# Patient Record
Sex: MALE | Race: WHITE | NOT HISPANIC OR LATINO | Employment: FULL TIME | URBAN - NONMETROPOLITAN AREA
[De-identification: names, ages, dates, MRNs, and addresses within clinical notes are randomized per-mention and may not be internally consistent; named-entity substitution may affect disease eponyms.]

---

## 2025-03-11 NOTE — PROGRESS NOTES
"Subjective   Patient ID: Oneil Montoya is a 35 y.o. male who presents for Establish Care.    HPI   Pt here to establish.    Has an issue involving his left big toe.  Noticed few weeks ago : there was hole in left great toe.  Since then it has progressed to large white halo with central hole.  No h/o diabetes.  FH of diabetes.  Denies any injury.  Has had a previous toenail infection yrs ago. No drainage.  Has no feeling in either feet.      He would also like bloodwork to screen for diabetes    History reviewed. No pertinent past medical history.  Current Outpatient Medications   Medication Sig Dispense Refill    amoxicillin-pot clavulanate (Augmentin) 875-125 mg tablet Take 1 tablet (875 mg) by mouth 2 times a day for 7 days. 14 tablet 0     No current facility-administered medications for this visit.       Review of Systems see hpi    Objective   /82   Pulse (!) 113   Ht 1.924 m (6' 3.75\")   Wt 142 kg (312 lb 9.6 oz)   SpO2 98%   BMI 38.30 kg/m²     Physical Exam  Vitals reviewed.   Constitutional:       Appearance: Normal appearance.   Cardiovascular:      Rate and Rhythm: Normal rate and regular rhythm.      Heart sounds: Normal heart sounds.   Pulmonary:      Effort: Pulmonary effort is normal.      Breath sounds: Normal breath sounds.   Skin:     Comments: On bottom of left great toe is a hole about 5 mm in diameter surrounded by white skin/tissue that has split into a fissure.  Some surrounding erythema. Have no idea how deep this hole goes.  Pt has no sensation over toes and 1/2 down foot; there is some feeling at heel.   Neurological:      Mental Status: He is alert.         Assessment/Plan   Assessment & Plan  Skin ulcer of left great toe, unspecified ulcer stage (Multi)  Will need debriding by podiatry.  Will check for osteomyelitis by xray  Orders:    XR toe left 2+ views; Future    Referral to Podiatry; Future    amoxicillin-pot clavulanate (Augmentin) 875-125 mg tablet; Take 1 tablet (875 " mg) by mouth 2 times a day for 7 days.    Elevated blood pressure reading in office without diagnosis of hypertension  Starting checking pressures at home; currently pt w/o ha, blurred vision, sob, cp.  Goal is < 135/85 at home.       Screening for diabetes mellitus    Orders:    Comprehensive Metabolic Panel; Future    Hemoglobin A1C; Future    Screening for lipid disorders    Orders:    Lipid Panel; Future

## 2025-03-13 ENCOUNTER — APPOINTMENT (OUTPATIENT)
Dept: PRIMARY CARE | Facility: CLINIC | Age: 35
End: 2025-03-13
Payer: COMMERCIAL

## 2025-03-17 ENCOUNTER — OFFICE VISIT (OUTPATIENT)
Dept: PRIMARY CARE | Facility: CLINIC | Age: 35
End: 2025-03-17
Payer: COMMERCIAL

## 2025-03-17 VITALS
DIASTOLIC BLOOD PRESSURE: 82 MMHG | BODY MASS INDEX: 38.07 KG/M2 | OXYGEN SATURATION: 98 % | HEART RATE: 113 BPM | SYSTOLIC BLOOD PRESSURE: 150 MMHG | WEIGHT: 312.6 LBS | HEIGHT: 76 IN

## 2025-03-17 DIAGNOSIS — Z13.1 SCREENING FOR DIABETES MELLITUS: ICD-10-CM

## 2025-03-17 DIAGNOSIS — R03.0 ELEVATED BLOOD PRESSURE READING IN OFFICE WITHOUT DIAGNOSIS OF HYPERTENSION: ICD-10-CM

## 2025-03-17 DIAGNOSIS — Z13.220 SCREENING FOR LIPID DISORDERS: ICD-10-CM

## 2025-03-17 DIAGNOSIS — L97.529: Primary | ICD-10-CM

## 2025-03-17 PROCEDURE — 1036F TOBACCO NON-USER: CPT | Performed by: FAMILY MEDICINE

## 2025-03-17 PROCEDURE — 3008F BODY MASS INDEX DOCD: CPT | Performed by: FAMILY MEDICINE

## 2025-03-17 PROCEDURE — 99204 OFFICE O/P NEW MOD 45 MIN: CPT | Performed by: FAMILY MEDICINE

## 2025-03-17 RX ORDER — AMOXICILLIN AND CLAVULANATE POTASSIUM 875; 125 MG/1; MG/1
875 TABLET, FILM COATED ORAL 2 TIMES DAILY
Qty: 14 TABLET | Refills: 0 | Status: SHIPPED | OUTPATIENT
Start: 2025-03-17 | End: 2025-03-24

## 2025-03-17 ASSESSMENT — PAIN SCALES - GENERAL: PAINLEVEL_OUTOF10: 0-NO PAIN

## 2025-03-17 ASSESSMENT — PATIENT HEALTH QUESTIONNAIRE - PHQ9
1. LITTLE INTEREST OR PLEASURE IN DOING THINGS: NOT AT ALL
2. FEELING DOWN, DEPRESSED OR HOPELESS: NOT AT ALL
SUM OF ALL RESPONSES TO PHQ9 QUESTIONS 1 AND 2: 0

## 2025-03-17 NOTE — PATIENT INSTRUCTIONS
Home readings should be <135/85.  Currently in the office it is 150/90. Call us in 2 wks with readings.

## 2025-03-18 DIAGNOSIS — E78.5 HYPERLIPIDEMIA ASSOCIATED WITH TYPE 2 DIABETES MELLITUS (MULTI): ICD-10-CM

## 2025-03-18 DIAGNOSIS — E11.65 UNCONTROLLED TYPE 2 DIABETES MELLITUS WITH HYPERGLYCEMIA: Primary | ICD-10-CM

## 2025-03-18 DIAGNOSIS — E11.69 HYPERLIPIDEMIA ASSOCIATED WITH TYPE 2 DIABETES MELLITUS (MULTI): ICD-10-CM

## 2025-03-18 LAB
ALBUMIN SERPL-MCNC: 4.6 G/DL (ref 3.6–5.1)
ALP SERPL-CCNC: 109 U/L (ref 36–130)
ALT SERPL-CCNC: 36 U/L (ref 9–46)
ANION GAP SERPL CALCULATED.4IONS-SCNC: 12 MMOL/L (CALC) (ref 7–17)
AST SERPL-CCNC: 22 U/L (ref 10–40)
BILIRUB SERPL-MCNC: 0.6 MG/DL (ref 0.2–1.2)
BUN SERPL-MCNC: 14 MG/DL (ref 7–25)
CALCIUM SERPL-MCNC: 9.9 MG/DL (ref 8.6–10.3)
CHLORIDE SERPL-SCNC: 99 MMOL/L (ref 98–110)
CHOLEST SERPL-MCNC: 230 MG/DL
CHOLEST/HDLC SERPL: 6.2 (CALC)
CO2 SERPL-SCNC: 22 MMOL/L (ref 20–32)
CREAT SERPL-MCNC: 0.76 MG/DL (ref 0.6–1.26)
EGFRCR SERPLBLD CKD-EPI 2021: 121 ML/MIN/1.73M2
EST. AVERAGE GLUCOSE BLD GHB EST-MCNC: 278 MG/DL
EST. AVERAGE GLUCOSE BLD GHB EST-SCNC: 15.4 MMOL/L
GLUCOSE SERPL-MCNC: 424 MG/DL (ref 65–139)
HBA1C MFR BLD: 11.3 % OF TOTAL HGB
HDLC SERPL-MCNC: 37 MG/DL
LDLC SERPL CALC-MCNC: ABNORMAL MG/DL
NONHDLC SERPL-MCNC: 193 MG/DL (CALC)
POTASSIUM SERPL-SCNC: 4.4 MMOL/L (ref 3.5–5.3)
PROT SERPL-MCNC: 7.5 G/DL (ref 6.1–8.1)
SODIUM SERPL-SCNC: 133 MMOL/L (ref 135–146)
TRIGL SERPL-MCNC: 697 MG/DL

## 2025-03-18 RX ORDER — PRAVASTATIN SODIUM 80 MG/1
80 TABLET ORAL DAILY
Qty: 90 TABLET | Refills: 0 | Status: SHIPPED | OUTPATIENT
Start: 2025-03-18 | End: 2025-06-16

## 2025-03-18 RX ORDER — METFORMIN HYDROCHLORIDE 500 MG/1
1000 TABLET, EXTENDED RELEASE ORAL
Qty: 120 TABLET | Refills: 0 | Status: SHIPPED | OUTPATIENT
Start: 2025-03-18 | End: 2025-04-17

## 2025-03-18 NOTE — RESULT ENCOUNTER NOTE
Hga1c points to diabetes with average glucose of 270 the last 3 months.  We need to start you on metformin er 500 mg (2) twice a day to get the sugars down and promote healing of the toe ulcer.  I have sent in a month's worth to [facility] in [place] and I want to see you in 4 weeks for a diabetic visit

## 2025-03-18 NOTE — RESULT ENCOUNTER NOTE
Cmp shows elevated hga1c which is very very high  Lipids are also abnormal: trigs are so high due to elevated glucose.  HDL is down due to unc. Diabetes.  Need to start a statin due to increased risk of MI/stroke due to having diabetes.  Have sent pravastatin 80 mg over to your pharmacy.

## 2025-03-19 ENCOUNTER — HOSPITAL ENCOUNTER (OUTPATIENT)
Dept: RADIOLOGY | Facility: HOSPITAL | Age: 35
Discharge: HOME | End: 2025-03-19
Payer: COMMERCIAL

## 2025-03-19 DIAGNOSIS — L97.529: ICD-10-CM

## 2025-03-19 PROCEDURE — 73660 X-RAY EXAM OF TOE(S): CPT | Mod: LT

## 2025-03-20 PROBLEM — E78.2 MIXED HYPERLIPIDEMIA: Status: ACTIVE | Noted: 2025-03-20

## 2025-03-20 PROBLEM — E10.65 UNCONTROLLED TYPE 1 DIABETES MELLITUS WITH HYPERGLYCEMIA: Status: ACTIVE | Noted: 2025-03-20

## 2025-03-20 PROBLEM — L97.529: Status: ACTIVE | Noted: 2025-03-20

## 2025-03-20 NOTE — PROGRESS NOTES
HPI:    Pt rtc 1 mos after being diagnosed with new onset uncontrolled type 2 diabetes and HLD.  He presented last month with a left great toe ulcer and seeing podiatry and the ulcer has healed very well.   He was started on metformin er 1000 mg bid and pravastatin 80 mg daily.  Tolerating both meds.   Diabetes Mellitus:          The patient is complaining of nothing         Blood sugar monitoring not done by pt          Hypoglycemic episodes are none         The results of the last HbgA1c were abnormal - at  11.3; today it is: 8.6         The microalbumin due         The feet/last exam done 3/17/25 with ulcer noted on left great toe that prompted referral to podiatry; pt with diminished sensation ; pulses palpable         Last eye exam due.  He will go see DR Toure         Side effects of the medications include none         Compliance with the medical regimen has been Good    Hyperlipidemia:   The patient does not use medications that may worsen dyslipidemias (corticosteroids, progestins, anabolic steroids, diuretics, beta-blockers, amiodarone, cyclosporine, olanzapine). The patient exercises infrequently.  The patient is not known to have coexisting coronary artery disease.      MEDICAL HISTORY:   Past Medical History:   Diagnosis Date    Mixed hyperlipidemia 03/20/2025    Skin ulcer of left great toe (Multi) 03/20/2025    Uncontrolled type 2 diabetes mellitus with hyperglycemia, without long-term current use of insulin 04/15/2025     MEDICATIONS:   Current Outpatient Medications   Medication Sig Dispense Refill    honey (Medihoney) gel topical gel Apply topically once daily.      pravastatin (Pravachol) 80 mg tablet Take 1 tablet (80 mg) by mouth once daily. 90 tablet 0    empagliflozin (Jardiance) 25 mg tablet Take 1 tablet (25 mg) by mouth once daily. 90 tablet 0    metFORMIN XR (Glucophage-XR) 500 mg 24 hr tablet Take 2 tablets (1,000 mg) by mouth 2 times daily (morning and late afternoon). Do not crush,  chew, or split. 360 tablet 0     No current facility-administered medications for this visit.     ALLERGIES: No Known Allergies  FAMILY HISTORY:   Family History   Problem Relation Name Age of Onset    No Known Problems Mother      Diabetes Father      Heart disease Father       SOCIAL HISTORY:   Social History     Tobacco Use    Smoking status: Never    Smokeless tobacco: Never   Vaping Use    Vaping status: Never Used   Substance Use Topics    Alcohol use: Never    Drug use: Never         ROS:      CONSTITUTION:  alert and oriented     OPHTHALMOLOGY:          no Change in vision.         CARDIOLOGY:          no Chest pain.  no Dyspnea on exertion.  no Shortness of breath.         ENDOCRINOLOGY:          no Excessive thirst.  no Excessive urination.  no Fatigue.  no Skin changes.  no Weight gain.  no Weight loss.         SKIN:          no Healing problems.         NEUROLOGY:          no Loss of sensation in specific body area.  no Burning pain in feet.  no Peripheral neuropathy.  no Tingling/numbness.    LABS:   Lab Results   Component Value Date    GLUCOSE 424 (H) 03/17/2025    CALCIUM 9.9 03/17/2025     (L) 03/17/2025    K 4.4 03/17/2025    CO2 22 03/17/2025    CL 99 03/17/2025    BUN 14 03/17/2025    CREATININE 0.76 03/17/2025     Lab Results   Component Value Date    CHOL 230 (H) 03/17/2025     Lab Results   Component Value Date    HDL 37 (L) 03/17/2025     Lab Results   Component Value Date    LDLCALC  03/17/2025      Comment:         LDL cholesterol not calculated. Triglyceride levels  greater than 400 mg/dL invalidate calculated LDL results.           Reference range: <100     Desirable range <100 mg/dL for primary prevention;    <70 mg/dL for patients with CHD or diabetic patients   with > or = 2 CHD risk factors.     LDL-C is now calculated using the Johnny-Castro   calculation, which is a validated novel method providing   better accuracy than the Friedewald equation in the   estimation of LDL-C.  "  Johnny HUGGINS et al. JUAN. 2013;310(19): 9055-4276   (http://education.datatracker/faq/OMP183)       Lab Results   Component Value Date    TRIG 697 (H) 03/17/2025     No components found for: \"CHOLHDL\"  Lab Results   Component Value Date    HGBA1C 8.5 (A) 04/15/2025          VITAL SIGNS:  /70   Pulse 96   Ht 1.924 m (6' 3.75\")   Wt 146 kg (322 lb 3.2 oz)   SpO2 99%   BMI 39.48 kg/m²     General Appearance: awake, alert, oriented, in no acute distress  Lungs: Normal expansion.  Clear to auscultation.  No rales, rhonchi, or wheezing.  Heart: Heart sounds are normal.  Regular rate and rhythm without murmur, gallop or rub.  Extremities: Extremities warm to touch, pink, with no edema.  Neurologic: Alert and oriented x 3, gait normal., reflexes normal and symmetric, strength and  sensation grossly normal   Monofilament: ulcer left great toe; sensation diminished; pulses palpable; hair present        Oneil was seen today for diabetes.  Diagnoses and all orders for this visit:  Uncontrolled type 2 diabetes mellitus with hyperglycemia (Primary)  -     Albumin-Creatinine Ratio, Urine Random; Future  -     Urinalysis with Reflex Microscopic; Future  -     POCT glycosylated hemoglobin (Hb A1C) manually resulted  -     HP Diabetic Foot Exam  -     metFORMIN XR (Glucophage-XR) 500 mg 24 hr tablet; Take 2 tablets (1,000 mg) by mouth 2 times daily (morning and late afternoon). Do not crush, chew, or split.  -     empagliflozin (Jardiance) 25 mg tablet; Take 1 tablet (25 mg) by mouth once daily.  -     Albumin-Creatinine Ratio, Urine Random  -     Urinalysis with Reflex Microscopic  -     Follow Up In Primary Care - Established; Future  Mixed hyperlipidemia  Comments:  cont pravastatin  Need for vaccination  -     Pneumococcal conjugate vaccine, 20-valent (PREVNAR 20)      "

## 2025-03-25 ENCOUNTER — OFFICE VISIT (OUTPATIENT)
Dept: WOUND CARE | Facility: HOSPITAL | Age: 35
End: 2025-03-25
Payer: COMMERCIAL

## 2025-03-25 PROCEDURE — 11042 DBRDMT SUBQ TIS 1ST 20SQCM/<: CPT

## 2025-03-25 PROCEDURE — 99213 OFFICE O/P EST LOW 20 MIN: CPT | Mod: 25

## 2025-03-31 ENCOUNTER — APPOINTMENT (OUTPATIENT)
Dept: PRIMARY CARE | Facility: CLINIC | Age: 35
End: 2025-03-31
Payer: COMMERCIAL

## 2025-04-01 ENCOUNTER — OFFICE VISIT (OUTPATIENT)
Dept: WOUND CARE | Facility: HOSPITAL | Age: 35
End: 2025-04-01
Payer: COMMERCIAL

## 2025-04-01 PROCEDURE — 11042 DBRDMT SUBQ TIS 1ST 20SQCM/<: CPT

## 2025-04-08 ENCOUNTER — OFFICE VISIT (OUTPATIENT)
Dept: WOUND CARE | Facility: HOSPITAL | Age: 35
End: 2025-04-08
Payer: COMMERCIAL

## 2025-04-08 PROCEDURE — 11042 DBRDMT SUBQ TIS 1ST 20SQCM/<: CPT | Mod: LT

## 2025-04-15 ENCOUNTER — OFFICE VISIT (OUTPATIENT)
Dept: PRIMARY CARE | Facility: CLINIC | Age: 35
End: 2025-04-15
Payer: COMMERCIAL

## 2025-04-15 VITALS
WEIGHT: 315 LBS | HEIGHT: 76 IN | HEART RATE: 96 BPM | DIASTOLIC BLOOD PRESSURE: 70 MMHG | SYSTOLIC BLOOD PRESSURE: 122 MMHG | BODY MASS INDEX: 38.36 KG/M2 | OXYGEN SATURATION: 99 %

## 2025-04-15 DIAGNOSIS — E11.65 UNCONTROLLED TYPE 2 DIABETES MELLITUS WITH HYPERGLYCEMIA: Primary | ICD-10-CM

## 2025-04-15 DIAGNOSIS — E78.2 MIXED HYPERLIPIDEMIA: ICD-10-CM

## 2025-04-15 DIAGNOSIS — Z23 NEED FOR VACCINATION: ICD-10-CM

## 2025-04-15 PROBLEM — E10.65 UNCONTROLLED TYPE 1 DIABETES MELLITUS WITH HYPERGLYCEMIA: Status: RESOLVED | Noted: 2025-03-20 | Resolved: 2025-04-15

## 2025-04-15 LAB — POC HEMOGLOBIN A1C: 8.5 % (ref 4.2–6.5)

## 2025-04-15 PROCEDURE — 3074F SYST BP LT 130 MM HG: CPT | Performed by: FAMILY MEDICINE

## 2025-04-15 PROCEDURE — 83036 HEMOGLOBIN GLYCOSYLATED A1C: CPT | Performed by: FAMILY MEDICINE

## 2025-04-15 PROCEDURE — 99214 OFFICE O/P EST MOD 30 MIN: CPT | Performed by: FAMILY MEDICINE

## 2025-04-15 PROCEDURE — 90677 PCV20 VACCINE IM: CPT | Performed by: FAMILY MEDICINE

## 2025-04-15 PROCEDURE — 3052F HG A1C>EQUAL 8.0%<EQUAL 9.0%: CPT | Performed by: FAMILY MEDICINE

## 2025-04-15 PROCEDURE — 1036F TOBACCO NON-USER: CPT | Performed by: FAMILY MEDICINE

## 2025-04-15 PROCEDURE — 3078F DIAST BP <80 MM HG: CPT | Performed by: FAMILY MEDICINE

## 2025-04-15 PROCEDURE — 3008F BODY MASS INDEX DOCD: CPT | Performed by: FAMILY MEDICINE

## 2025-04-15 PROCEDURE — 90471 IMMUNIZATION ADMIN: CPT | Performed by: FAMILY MEDICINE

## 2025-04-15 RX ORDER — METFORMIN HYDROCHLORIDE 500 MG/1
1000 TABLET, EXTENDED RELEASE ORAL
Qty: 360 TABLET | Refills: 0 | Status: SHIPPED | OUTPATIENT
Start: 2025-04-15 | End: 2025-07-14

## 2025-04-15 ASSESSMENT — PATIENT HEALTH QUESTIONNAIRE - PHQ9
2. FEELING DOWN, DEPRESSED OR HOPELESS: NOT AT ALL
1. LITTLE INTEREST OR PLEASURE IN DOING THINGS: NOT AT ALL
SUM OF ALL RESPONSES TO PHQ9 QUESTIONS 1 AND 2: 0

## 2025-04-15 ASSESSMENT — PAIN SCALES - GENERAL: PAINLEVEL_OUTOF10: 0-NO PAIN

## 2025-04-22 ENCOUNTER — OFFICE VISIT (OUTPATIENT)
Dept: WOUND CARE | Facility: HOSPITAL | Age: 35
End: 2025-04-22
Payer: COMMERCIAL

## 2025-04-22 PROCEDURE — 11042 DBRDMT SUBQ TIS 1ST 20SQCM/<: CPT

## 2025-05-19 NOTE — PROGRESS NOTES
HPI:    Here for 3 mos aj on [facility] dm:  on metformin er 1000 mg bid and jardiance 25 mg   Diabetes Mellitus:          The patient is complaining of nothing         Blood sugar monitoring not done by pt          Hypoglycemic episodes are none         The results of the last HbgA1c were abnormal - at 8.6 today it is:          The microalbumin due         The feet/last exam done 3/17/25 with ulcer noted on left great toe that prompted referral to podiatry; pt with diminished sensation ; pulses palpable         Last eye exam due.  He will go see DR Toure         Side effects of the medications include none         Compliance with the medical regimen has been Good    Hyperlipidemia:   The patient does not use medications that may worsen dyslipidemias (corticosteroids, progestins, anabolic steroids, diuretics, beta-blockers, amiodarone, cyclosporine, olanzapine). The patient exercises infrequently.  The patient is not known to have coexisting coronary artery disease.      MEDICAL HISTORY:   Past Medical History:   Diagnosis Date    Mixed hyperlipidemia 03/20/2025    Skin ulcer of left great toe (Multi) 03/20/2025    Uncontrolled type 2 diabetes mellitus with hyperglycemia, without long-term current use of insulin 04/15/2025     MEDICATIONS:   Current Outpatient Medications   Medication Sig Dispense Refill    empagliflozin (Jardiance) 25 mg tablet Take 1 tablet (25 mg) by mouth once daily. 90 tablet 0    honey (Medihoney) gel topical gel Apply topically once daily.      metFORMIN XR (Glucophage-XR) 500 mg 24 hr tablet Take 2 tablets (1,000 mg) by mouth 2 times daily (morning and late afternoon). Do not crush, chew, or split. 360 tablet 0    pravastatin (Pravachol) 80 mg tablet Take 1 tablet (80 mg) by mouth once daily. 90 tablet 0     No current facility-administered medications for this visit.     ALLERGIES: No Known Allergies  FAMILY HISTORY:   Family History   Problem Relation Name Age of Onset    No Known Problems  Mother      Diabetes Father      Heart disease Father       SOCIAL HISTORY:   Social History     Tobacco Use    Smoking status: Never    Smokeless tobacco: Never   Vaping Use    Vaping status: Never Used   Substance Use Topics    Alcohol use: Never    Drug use: Never         ROS:      CONSTITUTION:  alert and oriented     OPHTHALMOLOGY:          no Change in vision.         CARDIOLOGY:          no Chest pain.  no Dyspnea on exertion.  no Shortness of breath.         ENDOCRINOLOGY:          no Excessive thirst.  no Excessive urination.  no Fatigue.  no Skin changes.  no Weight gain.  no Weight loss.         SKIN:          no Healing problems.         NEUROLOGY:          no Loss of sensation in specific body area.  no Burning pain in feet.  no Peripheral neuropathy.  no Tingling/numbness.    LABS: did not get urine tests done  Lab Results   Component Value Date    GLUCOSE 424 (H) 03/17/2025    CALCIUM 9.9 03/17/2025     (L) 03/17/2025    K 4.4 03/17/2025    CO2 22 03/17/2025    CL 99 03/17/2025    BUN 14 03/17/2025    CREATININE 0.76 03/17/2025     Lab Results   Component Value Date    CHOL 230 (H) 03/17/2025     Lab Results   Component Value Date    HDL 37 (L) 03/17/2025     Lab Results   Component Value Date    LDLCALC  03/17/2025      Comment:         LDL cholesterol not calculated. Triglyceride levels  greater than 400 mg/dL invalidate calculated LDL results.           Reference range: <100     Desirable range <100 mg/dL for primary prevention;    <70 mg/dL for patients with CHD or diabetic patients   with > or = 2 CHD risk factors.     LDL-C is now calculated using the Dougie   calculation, which is a validated novel method providing   better accuracy than the Friedewald equation in the   estimation of LDL-C.   Johnny HUGGINS et al. JUAN. 2013;310(19): 8541-6972   (http://education.[employer].Movie Mouth/faq/CEM308)       Lab Results   Component Value Date    TRIG 697 (H) 03/17/2025     No components found  "for: \"CHOLHDL\"  Lab Results   Component Value Date    HGBA1C 8.5 (A) 04/15/2025          VITAL SIGNS:  There were no vitals taken for this visit.    General Appearance: awake, alert, oriented, in no acute distress  Lungs: Normal expansion.  Clear to auscultation.  No rales, rhonchi, or wheezing.  Heart: Heart sounds are normal.  Regular rate and rhythm without murmur, gallop or rub.  Extremities: Extremities warm to touch, pink, with no edema.  Neurologic: Alert and oriented x 3, gait normal., reflexes normal and symmetric, strength and  sensation grossly normal          Diagnoses and all orders for this visit:  Uncontrolled type 2 diabetes mellitus with hyperglycemia (Primary)  Mixed hyperlipidemia        "

## 2025-06-06 ENCOUNTER — APPOINTMENT (OUTPATIENT)
Dept: RADIOLOGY | Facility: HOSPITAL | Age: 35
DRG: 603 | End: 2025-06-06
Payer: COMMERCIAL

## 2025-06-06 ENCOUNTER — APPOINTMENT (OUTPATIENT)
Dept: CARDIOLOGY | Facility: HOSPITAL | Age: 35
DRG: 603 | End: 2025-06-06
Payer: COMMERCIAL

## 2025-06-06 ENCOUNTER — HOSPITAL ENCOUNTER (INPATIENT)
Facility: HOSPITAL | Age: 35
End: 2025-06-06
Attending: FAMILY MEDICINE | Admitting: INTERNAL MEDICINE
Payer: COMMERCIAL

## 2025-06-06 DIAGNOSIS — L03.90 CELLULITIS: Primary | ICD-10-CM

## 2025-06-06 DIAGNOSIS — L97.529: ICD-10-CM

## 2025-06-06 DIAGNOSIS — L03.115 CELLULITIS OF RIGHT LEG: ICD-10-CM

## 2025-06-06 LAB
ALBUMIN SERPL BCP-MCNC: 4.1 G/DL (ref 3.4–5)
ALP SERPL-CCNC: 70 U/L (ref 33–120)
ALT SERPL W P-5'-P-CCNC: 22 U/L (ref 10–52)
ANION GAP SERPL CALC-SCNC: 14 MMOL/L (ref 10–20)
APTT PPP: 33 SECONDS (ref 26–36)
AST SERPL W P-5'-P-CCNC: 15 U/L (ref 9–39)
BASOPHILS # BLD AUTO: 0.04 X10*3/UL (ref 0–0.1)
BASOPHILS NFR BLD AUTO: 0.3 %
BILIRUB SERPL-MCNC: 1.5 MG/DL (ref 0–1.2)
BUN SERPL-MCNC: 16 MG/DL (ref 6–23)
CALCIUM SERPL-MCNC: 9.3 MG/DL (ref 8.6–10.3)
CARDIAC TROPONIN I PNL SERPL HS: 4 NG/L (ref 0–20)
CARDIAC TROPONIN I PNL SERPL HS: 4 NG/L (ref 0–20)
CHLORIDE SERPL-SCNC: 96 MMOL/L (ref 98–107)
CO2 SERPL-SCNC: 27 MMOL/L (ref 21–32)
CREAT SERPL-MCNC: 0.91 MG/DL (ref 0.5–1.3)
CRP SERPL-MCNC: 27.18 MG/DL
D DIMER PPP FEU-MCNC: 302 NG/ML FEU
EGFRCR SERPLBLD CKD-EPI 2021: >90 ML/MIN/1.73M*2
EOSINOPHIL # BLD AUTO: 0.07 X10*3/UL (ref 0–0.7)
EOSINOPHIL NFR BLD AUTO: 0.5 %
ERYTHROCYTE [DISTWIDTH] IN BLOOD BY AUTOMATED COUNT: 12.8 % (ref 11.5–14.5)
ERYTHROCYTE [SEDIMENTATION RATE] IN BLOOD BY WESTERGREN METHOD: 10 MM/H (ref 0–15)
GLUCOSE BLD MANUAL STRIP-MCNC: 204 MG/DL (ref 74–99)
GLUCOSE SERPL-MCNC: 195 MG/DL (ref 74–99)
HCT VFR BLD AUTO: 45.8 % (ref 41–52)
HGB BLD-MCNC: 16.1 G/DL (ref 13.5–17.5)
IMM GRANULOCYTES # BLD AUTO: 0.13 X10*3/UL (ref 0–0.7)
IMM GRANULOCYTES NFR BLD AUTO: 0.9 % (ref 0–0.9)
INR PPP: 1.4 (ref 0.9–1.1)
LACTATE SERPL-SCNC: 2 MMOL/L (ref 0.4–2)
LYMPHOCYTES # BLD AUTO: 1.66 X10*3/UL (ref 1.2–4.8)
LYMPHOCYTES NFR BLD AUTO: 12 %
MAGNESIUM SERPL-MCNC: 1.75 MG/DL (ref 1.6–2.4)
MCH RBC QN AUTO: 29.7 PG (ref 26–34)
MCHC RBC AUTO-ENTMCNC: 35.2 G/DL (ref 32–36)
MCV RBC AUTO: 84 FL (ref 80–100)
MONOCYTES # BLD AUTO: 0.96 X10*3/UL (ref 0.1–1)
MONOCYTES NFR BLD AUTO: 6.9 %
NEUTROPHILS # BLD AUTO: 10.98 X10*3/UL (ref 1.2–7.7)
NEUTROPHILS NFR BLD AUTO: 79.4 %
NRBC BLD-RTO: 0 /100 WBCS (ref 0–0)
PLATELET # BLD AUTO: 188 X10*3/UL (ref 150–450)
POTASSIUM SERPL-SCNC: 3.4 MMOL/L (ref 3.5–5.3)
PROT SERPL-MCNC: 7.7 G/DL (ref 6.4–8.2)
PROTHROMBIN TIME: 15.6 SECONDS (ref 9.8–12.4)
RBC # BLD AUTO: 5.43 X10*6/UL (ref 4.5–5.9)
SODIUM SERPL-SCNC: 134 MMOL/L (ref 136–145)
WBC # BLD AUTO: 13.8 X10*3/UL (ref 4.4–11.3)

## 2025-06-06 PROCEDURE — 2500000004 HC RX 250 GENERAL PHARMACY W/ HCPCS (ALT 636 FOR OP/ED): Mod: IPSPLIT

## 2025-06-06 PROCEDURE — 80053 COMPREHEN METABOLIC PANEL: CPT | Performed by: FAMILY MEDICINE

## 2025-06-06 PROCEDURE — 76882 US LMTD JT/FCL EVL NVASC XTR: CPT | Mod: FOREIGN READ | Performed by: RADIOLOGY

## 2025-06-06 PROCEDURE — 85652 RBC SED RATE AUTOMATED: CPT | Performed by: HOSPITALIST

## 2025-06-06 PROCEDURE — 84484 ASSAY OF TROPONIN QUANT: CPT | Performed by: FAMILY MEDICINE

## 2025-06-06 PROCEDURE — 85730 THROMBOPLASTIN TIME PARTIAL: CPT | Performed by: FAMILY MEDICINE

## 2025-06-06 PROCEDURE — 99285 EMERGENCY DEPT VISIT HI MDM: CPT | Mod: 25 | Performed by: FAMILY MEDICINE

## 2025-06-06 PROCEDURE — 85025 COMPLETE CBC W/AUTO DIFF WBC: CPT | Performed by: FAMILY MEDICINE

## 2025-06-06 PROCEDURE — 73590 X-RAY EXAM OF LOWER LEG: CPT | Mod: RIGHT SIDE | Performed by: RADIOLOGY

## 2025-06-06 PROCEDURE — 1200000002 HC GENERAL ROOM WITH TELEMETRY DAILY: Mod: IPSPLIT

## 2025-06-06 PROCEDURE — 87040 BLOOD CULTURE FOR BACTERIA: CPT | Mod: GENLAB | Performed by: FAMILY MEDICINE

## 2025-06-06 PROCEDURE — 93005 ELECTROCARDIOGRAM TRACING: CPT

## 2025-06-06 PROCEDURE — 83735 ASSAY OF MAGNESIUM: CPT | Performed by: FAMILY MEDICINE

## 2025-06-06 PROCEDURE — 2500000002 HC RX 250 W HCPCS SELF ADMINISTERED DRUGS (ALT 637 FOR MEDICARE OP, ALT 636 FOR OP/ED): Mod: IPSPLIT | Performed by: HOSPITALIST

## 2025-06-06 PROCEDURE — 85610 PROTHROMBIN TIME: CPT | Performed by: FAMILY MEDICINE

## 2025-06-06 PROCEDURE — 82947 ASSAY GLUCOSE BLOOD QUANT: CPT | Mod: IPSPLIT

## 2025-06-06 PROCEDURE — 93971 EXTREMITY STUDY: CPT

## 2025-06-06 PROCEDURE — 36415 COLL VENOUS BLD VENIPUNCTURE: CPT | Performed by: FAMILY MEDICINE

## 2025-06-06 PROCEDURE — 85379 FIBRIN DEGRADATION QUANT: CPT | Performed by: FAMILY MEDICINE

## 2025-06-06 PROCEDURE — 94760 N-INVAS EAR/PLS OXIMETRY 1: CPT | Mod: IPSPLIT

## 2025-06-06 PROCEDURE — 86140 C-REACTIVE PROTEIN: CPT | Performed by: HOSPITALIST

## 2025-06-06 PROCEDURE — 2500000004 HC RX 250 GENERAL PHARMACY W/ HCPCS (ALT 636 FOR OP/ED): Mod: IPSPLIT | Performed by: HOSPITALIST

## 2025-06-06 PROCEDURE — 73590 X-RAY EXAM OF LOWER LEG: CPT | Mod: RT

## 2025-06-06 PROCEDURE — 83605 ASSAY OF LACTIC ACID: CPT | Performed by: FAMILY MEDICINE

## 2025-06-06 PROCEDURE — 2500000004 HC RX 250 GENERAL PHARMACY W/ HCPCS (ALT 636 FOR OP/ED)

## 2025-06-06 PROCEDURE — 87075 CULTR BACTERIA EXCEPT BLOOD: CPT | Mod: GENLAB | Performed by: FAMILY MEDICINE

## 2025-06-06 PROCEDURE — 2500000004 HC RX 250 GENERAL PHARMACY W/ HCPCS (ALT 636 FOR OP/ED): Performed by: FAMILY MEDICINE

## 2025-06-06 RX ORDER — SODIUM CHLORIDE 9 MG/ML
125 INJECTION, SOLUTION INTRAVENOUS CONTINUOUS
Status: DISPENSED | OUTPATIENT
Start: 2025-06-06 | End: 2025-06-07

## 2025-06-06 RX ORDER — SODIUM CHLORIDE 9 MG/ML
125 INJECTION, SOLUTION INTRAVENOUS CONTINUOUS
Status: DISCONTINUED | OUTPATIENT
Start: 2025-06-06 | End: 2025-06-06

## 2025-06-06 RX ORDER — PRAVASTATIN SODIUM 20 MG/1
80 TABLET ORAL NIGHTLY
Status: DISPENSED | OUTPATIENT
Start: 2025-06-07

## 2025-06-06 RX ORDER — INSULIN LISPRO 100 [IU]/ML
0-10 INJECTION, SOLUTION INTRAVENOUS; SUBCUTANEOUS
Status: DISPENSED | OUTPATIENT
Start: 2025-06-07

## 2025-06-06 RX ORDER — POTASSIUM CHLORIDE 20 MEQ/1
20 TABLET, EXTENDED RELEASE ORAL ONCE
Status: COMPLETED | OUTPATIENT
Start: 2025-06-06 | End: 2025-06-06

## 2025-06-06 RX ORDER — VANCOMYCIN HYDROCHLORIDE 1.5 G/30ML
INJECTION, POWDER, LYOPHILIZED, FOR SOLUTION INTRAVENOUS
Status: COMPLETED
Start: 2025-06-06 | End: 2025-06-06

## 2025-06-06 RX ORDER — ENOXAPARIN SODIUM 100 MG/ML
40 INJECTION SUBCUTANEOUS DAILY
Status: DISPENSED | OUTPATIENT
Start: 2025-06-06

## 2025-06-06 RX ORDER — SODIUM CHLORIDE 9 MG/ML
INJECTION, SOLUTION INTRAVENOUS
Status: COMPLETED
Start: 2025-06-06 | End: 2025-06-06

## 2025-06-06 RX ORDER — LOPERAMIDE HYDROCHLORIDE 2 MG/1
2 CAPSULE ORAL 4 TIMES DAILY PRN
Status: ACTIVE | OUTPATIENT
Start: 2025-06-06

## 2025-06-06 RX ORDER — METFORMIN HYDROCHLORIDE 500 MG/1
1000 TABLET, EXTENDED RELEASE ORAL
Status: DISPENSED | OUTPATIENT
Start: 2025-06-07

## 2025-06-06 RX ORDER — ACETAMINOPHEN 325 MG/1
650 TABLET ORAL EVERY 6 HOURS PRN
Status: DISPENSED | OUTPATIENT
Start: 2025-06-06

## 2025-06-06 RX ORDER — ACETAMINOPHEN 500 MG
10 TABLET ORAL DAILY PRN
Status: DISPENSED | OUTPATIENT
Start: 2025-06-06

## 2025-06-06 RX ORDER — POLYETHYLENE GLYCOL 3350 17 G/17G
17 POWDER, FOR SOLUTION ORAL 2 TIMES DAILY PRN
Status: DISPENSED | OUTPATIENT
Start: 2025-06-06

## 2025-06-06 RX ORDER — VANCOMYCIN HYDROCHLORIDE 1 G/200ML
2000 INJECTION, SOLUTION INTRAVENOUS ONCE
Status: DISCONTINUED | OUTPATIENT
Start: 2025-06-06 | End: 2025-06-06

## 2025-06-06 RX ORDER — ALUMINUM HYDROXIDE, MAGNESIUM HYDROXIDE, AND SIMETHICONE 1200; 120; 1200 MG/30ML; MG/30ML; MG/30ML
20 SUSPENSION ORAL EVERY 4 HOURS PRN
Status: ACTIVE | OUTPATIENT
Start: 2025-06-06

## 2025-06-06 RX ORDER — VANCOMYCIN HYDROCHLORIDE 1 G/20ML
INJECTION, POWDER, LYOPHILIZED, FOR SOLUTION INTRAVENOUS DAILY PRN
Status: DISCONTINUED | OUTPATIENT
Start: 2025-06-06 | End: 2025-06-08

## 2025-06-06 RX ORDER — ONDANSETRON HYDROCHLORIDE 2 MG/ML
4 INJECTION, SOLUTION INTRAVENOUS EVERY 4 HOURS PRN
Status: ACTIVE | OUTPATIENT
Start: 2025-06-06

## 2025-06-06 RX ADMIN — SODIUM CHLORIDE 125 ML/HR: 0.9 INJECTION, SOLUTION INTRAVENOUS at 23:54

## 2025-06-06 RX ADMIN — SODIUM CHLORIDE 125 ML/HR: 0.9 INJECTION, SOLUTION INTRAVENOUS at 23:56

## 2025-06-06 RX ADMIN — PIPERACILLIN SODIUM AND TAZOBACTAM SODIUM 3.38 G: 3; .375 INJECTION, SOLUTION INTRAVENOUS at 19:55

## 2025-06-06 RX ADMIN — ENOXAPARIN SODIUM 40 MG: 40 INJECTION SUBCUTANEOUS at 21:38

## 2025-06-06 RX ADMIN — SODIUM CHLORIDE 125 ML/HR: 0.9 INJECTION, SOLUTION INTRAVENOUS at 18:24

## 2025-06-06 RX ADMIN — SODIUM CHLORIDE, SODIUM LACTATE, POTASSIUM CHLORIDE, AND CALCIUM CHLORIDE 1000 ML: 600; 310; 30; 20 INJECTION, SOLUTION INTRAVENOUS at 21:38

## 2025-06-06 RX ADMIN — POTASSIUM CHLORIDE 20 MEQ: 1500 TABLET, EXTENDED RELEASE ORAL at 21:39

## 2025-06-06 RX ADMIN — VANCOMYCIN HYDROCHLORIDE 1.5 G: 1.5 INJECTION, POWDER, LYOPHILIZED, FOR SOLUTION INTRAVENOUS at 23:55

## 2025-06-06 SDOH — ECONOMIC STABILITY: INCOME INSECURITY: IN THE PAST 12 MONTHS HAS THE ELECTRIC, GAS, OIL, OR WATER COMPANY THREATENED TO SHUT OFF SERVICES IN YOUR HOME?: NO

## 2025-06-06 SDOH — SOCIAL STABILITY: SOCIAL NETWORK
IN A TYPICAL WEEK, HOW MANY TIMES DO YOU TALK ON THE PHONE WITH FAMILY, FRIENDS, OR NEIGHBORS?: MORE THAN THREE TIMES A WEEK

## 2025-06-06 SDOH — SOCIAL STABILITY: SOCIAL NETWORK
DO YOU BELONG TO ANY CLUBS OR ORGANIZATIONS SUCH AS CHURCH GROUPS, UNIONS, FRATERNAL OR ATHLETIC GROUPS, OR SCHOOL GROUPS?: NO

## 2025-06-06 SDOH — SOCIAL STABILITY: SOCIAL INSECURITY: ARE YOU MARRIED, WIDOWED, DIVORCED, SEPARATED, NEVER MARRIED, OR LIVING WITH A PARTNER?: DIVORCED

## 2025-06-06 SDOH — SOCIAL STABILITY: SOCIAL NETWORK: HOW OFTEN DO YOU ATTEND MEETINGS OF THE CLUBS OR ORGANIZATIONS YOU BELONG TO?: NEVER

## 2025-06-06 SDOH — SOCIAL STABILITY: SOCIAL INSECURITY
WITHIN THE LAST YEAR, HAVE YOU BEEN KICKED, HIT, SLAPPED, OR OTHERWISE PHYSICALLY HURT BY YOUR PARTNER OR EX-PARTNER?: NO

## 2025-06-06 SDOH — SOCIAL STABILITY: SOCIAL INSECURITY: DO YOU FEEL UNSAFE GOING BACK TO THE PLACE WHERE YOU ARE LIVING?: NO

## 2025-06-06 SDOH — SOCIAL STABILITY: SOCIAL INSECURITY: WITHIN THE LAST YEAR, HAVE YOU BEEN AFRAID OF YOUR PARTNER OR EX-PARTNER?: NO

## 2025-06-06 SDOH — SOCIAL STABILITY: SOCIAL INSECURITY
WITHIN THE LAST YEAR, HAVE YOU BEEN RAPED OR FORCED TO HAVE ANY KIND OF SEXUAL ACTIVITY BY YOUR PARTNER OR EX-PARTNER?: NO

## 2025-06-06 SDOH — ECONOMIC STABILITY: FOOD INSECURITY: WITHIN THE PAST 12 MONTHS, THE FOOD YOU BOUGHT JUST DIDN'T LAST AND YOU DIDN'T HAVE MONEY TO GET MORE.: NEVER TRUE

## 2025-06-06 SDOH — SOCIAL STABILITY: SOCIAL INSECURITY: WERE YOU ABLE TO COMPLETE ALL THE BEHAVIORAL HEALTH SCREENINGS?: YES

## 2025-06-06 SDOH — ECONOMIC STABILITY: FOOD INSECURITY: WITHIN THE PAST 12 MONTHS, YOU WORRIED THAT YOUR FOOD WOULD RUN OUT BEFORE YOU GOT THE MONEY TO BUY MORE.: NEVER TRUE

## 2025-06-06 SDOH — SOCIAL STABILITY: SOCIAL INSECURITY: WITHIN THE LAST YEAR, HAVE YOU BEEN HUMILIATED OR EMOTIONALLY ABUSED IN OTHER WAYS BY YOUR PARTNER OR EX-PARTNER?: NO

## 2025-06-06 SDOH — HEALTH STABILITY: PHYSICAL HEALTH: ON AVERAGE, HOW MANY MINUTES DO YOU ENGAGE IN EXERCISE AT THIS LEVEL?: 0 MIN

## 2025-06-06 SDOH — SOCIAL STABILITY: SOCIAL NETWORK: HOW OFTEN DO YOU ATTEND CHURCH OR RELIGIOUS SERVICES?: NEVER

## 2025-06-06 SDOH — SOCIAL STABILITY: SOCIAL INSECURITY: DO YOU FEEL ANYONE HAS EXPLOITED OR TAKEN ADVANTAGE OF YOU FINANCIALLY OR OF YOUR PERSONAL PROPERTY?: NO

## 2025-06-06 SDOH — SOCIAL STABILITY: SOCIAL INSECURITY: ABUSE: ADULT

## 2025-06-06 SDOH — HEALTH STABILITY: MENTAL HEALTH
DO YOU FEEL STRESS - TENSE, RESTLESS, NERVOUS, OR ANXIOUS, OR UNABLE TO SLEEP AT NIGHT BECAUSE YOUR MIND IS TROUBLED ALL THE TIME - THESE DAYS?: NOT AT ALL

## 2025-06-06 SDOH — SOCIAL STABILITY: SOCIAL INSECURITY: HAVE YOU HAD THOUGHTS OF HARMING ANYONE ELSE?: NO

## 2025-06-06 SDOH — SOCIAL STABILITY: SOCIAL NETWORK: HOW OFTEN DO YOU GET TOGETHER WITH FRIENDS OR RELATIVES?: MORE THAN THREE TIMES A WEEK

## 2025-06-06 SDOH — HEALTH STABILITY: PHYSICAL HEALTH
HOW OFTEN DO YOU NEED TO HAVE SOMEONE HELP YOU WHEN YOU READ INSTRUCTIONS, PAMPHLETS, OR OTHER WRITTEN MATERIAL FROM YOUR DOCTOR OR PHARMACY?: RARELY

## 2025-06-06 SDOH — HEALTH STABILITY: PHYSICAL HEALTH: ON AVERAGE, HOW MANY DAYS PER WEEK DO YOU ENGAGE IN MODERATE TO STRENUOUS EXERCISE (LIKE A BRISK WALK)?: 0 DAYS

## 2025-06-06 SDOH — SOCIAL STABILITY: SOCIAL INSECURITY: ARE YOU OR HAVE YOU BEEN THREATENED OR ABUSED PHYSICALLY, EMOTIONALLY, OR SEXUALLY BY ANYONE?: NO

## 2025-06-06 SDOH — SOCIAL STABILITY: SOCIAL INSECURITY: HAVE YOU HAD ANY THOUGHTS OF HARMING ANYONE ELSE?: NO

## 2025-06-06 SDOH — SOCIAL STABILITY: SOCIAL INSECURITY: DOES ANYONE TRY TO KEEP YOU FROM HAVING/CONTACTING OTHER FRIENDS OR DOING THINGS OUTSIDE YOUR HOME?: NO

## 2025-06-06 SDOH — SOCIAL STABILITY: SOCIAL INSECURITY: HAS ANYONE EVER THREATENED TO HURT YOUR FAMILY OR YOUR PETS?: NO

## 2025-06-06 SDOH — SOCIAL STABILITY: SOCIAL INSECURITY: ARE THERE ANY APPARENT SIGNS OF INJURIES/BEHAVIORS THAT COULD BE RELATED TO ABUSE/NEGLECT?: NO

## 2025-06-06 ASSESSMENT — LIFESTYLE VARIABLES
SUBSTANCE_ABUSE_PAST_12_MONTHS: NO
SKIP TO QUESTIONS 9-10: 1
HOW OFTEN DO YOU HAVE A DRINK CONTAINING ALCOHOL: NEVER
HOW MANY STANDARD DRINKS CONTAINING ALCOHOL DO YOU HAVE ON A TYPICAL DAY: PATIENT DOES NOT DRINK
AUDIT-C TOTAL SCORE: 0
AUDIT-C TOTAL SCORE: 0
PRESCIPTION_ABUSE_PAST_12_MONTHS: NO
HOW OFTEN DO YOU HAVE 6 OR MORE DRINKS ON ONE OCCASION: NEVER

## 2025-06-06 ASSESSMENT — ACTIVITIES OF DAILY LIVING (ADL)
HEARING - RIGHT EAR: FUNCTIONAL
DRESSING YOURSELF: INDEPENDENT
LACK_OF_TRANSPORTATION: NO
PATIENT'S MEMORY ADEQUATE TO SAFELY COMPLETE DAILY ACTIVITIES?: YES
WALKS IN HOME: INDEPENDENT
BATHING: INDEPENDENT
TOILETING: INDEPENDENT
JUDGMENT_ADEQUATE_SAFELY_COMPLETE_DAILY_ACTIVITIES: YES
ADEQUATE_TO_COMPLETE_ADL: YES
GROOMING: INDEPENDENT
FEEDING YOURSELF: INDEPENDENT
HEARING - LEFT EAR: FUNCTIONAL

## 2025-06-06 ASSESSMENT — COGNITIVE AND FUNCTIONAL STATUS - GENERAL
DAILY ACTIVITIY SCORE: 24
MOBILITY SCORE: 24
PATIENT BASELINE BEDBOUND: NO

## 2025-06-06 ASSESSMENT — COLUMBIA-SUICIDE SEVERITY RATING SCALE - C-SSRS

## 2025-06-06 ASSESSMENT — PATIENT HEALTH QUESTIONNAIRE - PHQ9
1. LITTLE INTEREST OR PLEASURE IN DOING THINGS: NOT AT ALL
SUM OF ALL RESPONSES TO PHQ9 QUESTIONS 1 & 2: 0
2. FEELING DOWN, DEPRESSED OR HOPELESS: NOT AT ALL

## 2025-06-06 ASSESSMENT — PAIN DESCRIPTION - PAIN TYPE: TYPE: ACUTE PAIN

## 2025-06-06 ASSESSMENT — PAIN DESCRIPTION - LOCATION: LOCATION: LEG

## 2025-06-06 ASSESSMENT — PAIN SCALES - GENERAL
PAINLEVEL_OUTOF10: 0 - NO PAIN
PAINLEVEL_OUTOF10: 5 - MODERATE PAIN

## 2025-06-06 ASSESSMENT — PAIN DESCRIPTION - ORIENTATION: ORIENTATION: RIGHT

## 2025-06-06 ASSESSMENT — PAIN DESCRIPTION - FREQUENCY: FREQUENCY: CONSTANT/CONTINUOUS

## 2025-06-06 ASSESSMENT — PAIN - FUNCTIONAL ASSESSMENT: PAIN_FUNCTIONAL_ASSESSMENT: 0-10

## 2025-06-06 ASSESSMENT — PAIN DESCRIPTION - DESCRIPTORS: DESCRIPTORS: THROBBING

## 2025-06-06 NOTE — ED PROVIDER NOTES
Emergency Department Provider Note       History of Present Illness     History provided by: Patient  Limitations to History: None  External Records Reviewed with Brief Summary: None    HPI:  Oneil Montoya is a 35 y.o. male came to the emergency room with complaint of right leg redness swelling and soreness in pain in the anterior skin area he denies any recent trauma fall injury.  Denies any.  Patient chest pain or short of breath.  Denies history of blood clots he does not smoke.  Denies history of chronic cardiopulmonary problems or prediabetes.    Physical Exam   Triage vitals:  T 37.4 °C (99.3 °F)  HR (!) 133  BP (!) 130/93  RR 18  O2 98 % None (Room air)    General: Awake, alert, in no acute distress, regular rate and rhythm awake alert pleasant cooperative nontoxic no acute distress.  Eyes: Gaze conjugate.  No scleral icterus or injection  HENT: Normo-cephalic, atraumatic. No stridor  CV: Regular rate, regular rhythm. Radial pulses 2+ bilaterally  Resp: Breathing non-labored, speaking in full sentences.  Clear to auscultation bilaterally  GI: Soft, non-distended, non-tender. No rebound or guarding.  : No  complaints  MSK/patient was noted to have erythematous rash on the right leg below the knee anterior shin area with mild intensely erythematous almost look like a purplish transformation rate but no P2 petechiae no ecchymosis irregular slightly warm to touch calf is soft nontender no palpable venous cords good capillary refill retroportal intact sensation Goodemote hip ankle joint.  Both upper extremity good motion nontender.    Skin: Intensely erythematous rash of the right anterior shin area with purplish transformation part of the rash but no petechiae ecchymosis.  No other rashes no bruises or any recent injury to any other part of body.  Warm. Appropriate color  Neuro: Alert. Oriented. Face symmetric. Speech is fluent.  Gross strength and sensation intact in b/l UE and LEs  Psych: Appropriate  mood and affect      Medical Decision Making & ED Course   Medical Decision Makin y.o. male reported emergency room with right leg redness and treated patient redness and tenderness erythema with blood pressure transformation no petechiae respiratory Nontender.  Complaining Not Feeling Good Having Chills, Culture Labs Ordered As Well As Ultrasound and EKG and Troponin.  Patient was hemodynamically stable Workup in Progress at 7 PM  Patient white counts are 11.3 hemoglobin 14 medic at 48 with 9.09% absolute neutrophils with left shift to suggest an infection bacteria.  Patient white glucose of 208 sodium 134 potassium 3.2 low.,  2 set of troponin -3 and 3.     Due to right leg pain and swelling and redness D-dimer obtain, D-dimer normal range CO2.  PT/INR was 15.6 and 1.4.  Magnesium lactate was normal 1.752.0 respectively.  Negative CO2 lactate level was 2.0 serum glucose 195 sodium 134 potassium 3.4    ---- Ultrasound right lower extremity negative for DVT please see report in the system, tib-fib x-ray also was negative.  Patient appears he has marked cellulitis right leg with severe erythema to the purplish to his former transformations some of the area but rest of the skin examination was normal.  Patient required aggressive IV antibiotic hydration and was admitted Case discussed admitting and admitted.  Patient heart score was 4    Differential diagnoses considered include but are not limited to: Right leg superficial redness swelling and pain, DVT, superficial thrombophlebitis, cellulitis, contusion, allergic rash, infection, workup in progress  Social Determinants of Health which Significantly Impact Care: None noted     EKG Independent Interpretation: EKG interpreted by myself. Please see ED Course for full interpretation.    Independent Result Review and Interpretation: All the labs EKG were interpreted by me.  Imaging study ordered, workup in progress.    Chronic conditions affecting the patient's  care: None reported by patient    The patient was discussed with the following consultants/services: Workup in progress still in the ER    Care Considerations: No acute process treated in the ER    ED Course:  Diagnoses as of 06/07/25 1633   Cellulitis   Cellulitis of right leg   EKG obtained at 1726 hrs. showed sinus tach cardia rate of 127 poor QRS progression consider anterior infarct versus normal variant.  No ST-T evaluation.  No STEMI.  Abnormal EKG.  I read this EKG.    Disposition   As a result of their workup, the patient will require admission to the hospital.  The patient was informed of his diagnosis.  The patient was given the opportunity to ask questions and I answered them. The patient agreed to be admitted to the hospital.    Procedures   Procedures        Jimy Richard MD  Emergency Medicine                                                       Jimy Richard MD  06/07/25 3085

## 2025-06-06 NOTE — ED TRIAGE NOTES
Pt ambulatory to ED with complaints of right lower leg swelling/redness that started yesterday. No open wounds. Warm to touch. Denies shortness of breath or chest pain. Denies fevers/chills, n/v/d.

## 2025-06-07 LAB
ANION GAP SERPL CALC-SCNC: 13 MMOL/L (ref 10–20)
BASOPHILS # BLD AUTO: 0.01 X10*3/UL (ref 0–0.1)
BASOPHILS NFR BLD AUTO: 0.1 %
BUN SERPL-MCNC: 14 MG/DL (ref 6–23)
CALCIUM SERPL-MCNC: 8.8 MG/DL (ref 8.6–10.3)
CHLORIDE SERPL-SCNC: 99 MMOL/L (ref 98–107)
CO2 SERPL-SCNC: 25 MMOL/L (ref 21–32)
CREAT SERPL-MCNC: 0.87 MG/DL (ref 0.5–1.3)
EGFRCR SERPLBLD CKD-EPI 2021: >90 ML/MIN/1.73M*2
EOSINOPHIL # BLD AUTO: 0 X10*3/UL (ref 0–0.7)
EOSINOPHIL NFR BLD AUTO: 0 %
ERYTHROCYTE [DISTWIDTH] IN BLOOD BY AUTOMATED COUNT: 13 % (ref 11.5–14.5)
GLUCOSE BLD MANUAL STRIP-MCNC: 126 MG/DL (ref 74–99)
GLUCOSE BLD MANUAL STRIP-MCNC: 144 MG/DL (ref 74–99)
GLUCOSE BLD MANUAL STRIP-MCNC: 150 MG/DL (ref 74–99)
GLUCOSE BLD MANUAL STRIP-MCNC: 197 MG/DL (ref 74–99)
GLUCOSE SERPL-MCNC: 208 MG/DL (ref 74–99)
HCT VFR BLD AUTO: 40.2 % (ref 41–52)
HGB BLD-MCNC: 14 G/DL (ref 13.5–17.5)
IMM GRANULOCYTES # BLD AUTO: 0.09 X10*3/UL (ref 0–0.7)
IMM GRANULOCYTES NFR BLD AUTO: 0.8 % (ref 0–0.9)
LYMPHOCYTES # BLD AUTO: 1.26 X10*3/UL (ref 1.2–4.8)
LYMPHOCYTES NFR BLD AUTO: 11.2 %
MCH RBC QN AUTO: 30.1 PG (ref 26–34)
MCHC RBC AUTO-ENTMCNC: 34.8 G/DL (ref 32–36)
MCV RBC AUTO: 87 FL (ref 80–100)
MONOCYTES # BLD AUTO: 0.84 X10*3/UL (ref 0.1–1)
MONOCYTES NFR BLD AUTO: 7.4 %
NEUTROPHILS # BLD AUTO: 9.09 X10*3/UL (ref 1.2–7.7)
NEUTROPHILS NFR BLD AUTO: 80.5 %
NRBC BLD-RTO: 0 /100 WBCS (ref 0–0)
PLATELET # BLD AUTO: 150 X10*3/UL (ref 150–450)
POTASSIUM SERPL-SCNC: 3.2 MMOL/L (ref 3.5–5.3)
RBC # BLD AUTO: 4.65 X10*6/UL (ref 4.5–5.9)
SODIUM SERPL-SCNC: 134 MMOL/L (ref 136–145)
WBC # BLD AUTO: 11.3 X10*3/UL (ref 4.4–11.3)

## 2025-06-07 PROCEDURE — 94760 N-INVAS EAR/PLS OXIMETRY 1: CPT | Mod: IPSPLIT

## 2025-06-07 PROCEDURE — 2500000004 HC RX 250 GENERAL PHARMACY W/ HCPCS (ALT 636 FOR OP/ED): Mod: IPSPLIT | Performed by: HOSPITALIST

## 2025-06-07 PROCEDURE — 2500000004 HC RX 250 GENERAL PHARMACY W/ HCPCS (ALT 636 FOR OP/ED): Mod: JZ,IPSPLIT | Performed by: NURSE PRACTITIONER

## 2025-06-07 PROCEDURE — 99223 1ST HOSP IP/OBS HIGH 75: CPT | Performed by: NURSE PRACTITIONER

## 2025-06-07 PROCEDURE — 82947 ASSAY GLUCOSE BLOOD QUANT: CPT | Mod: IPSPLIT

## 2025-06-07 PROCEDURE — 36415 COLL VENOUS BLD VENIPUNCTURE: CPT | Mod: IPSPLIT | Performed by: HOSPITALIST

## 2025-06-07 PROCEDURE — 2500000004 HC RX 250 GENERAL PHARMACY W/ HCPCS (ALT 636 FOR OP/ED): Mod: IPSPLIT | Performed by: INTERNAL MEDICINE

## 2025-06-07 PROCEDURE — 85025 COMPLETE CBC W/AUTO DIFF WBC: CPT | Mod: IPSPLIT | Performed by: HOSPITALIST

## 2025-06-07 PROCEDURE — 2500000001 HC RX 250 WO HCPCS SELF ADMINISTERED DRUGS (ALT 637 FOR MEDICARE OP): Mod: IPSPLIT | Performed by: HOSPITALIST

## 2025-06-07 PROCEDURE — 80048 BASIC METABOLIC PNL TOTAL CA: CPT | Mod: IPSPLIT | Performed by: HOSPITALIST

## 2025-06-07 PROCEDURE — 2500000002 HC RX 250 W HCPCS SELF ADMINISTERED DRUGS (ALT 637 FOR MEDICARE OP, ALT 636 FOR OP/ED): Mod: IPSPLIT | Performed by: HOSPITALIST

## 2025-06-07 PROCEDURE — 1200000002 HC GENERAL ROOM WITH TELEMETRY DAILY: Mod: IPSPLIT

## 2025-06-07 PROCEDURE — 2500000002 HC RX 250 W HCPCS SELF ADMINISTERED DRUGS (ALT 637 FOR MEDICARE OP, ALT 636 FOR OP/ED): Mod: IPSPLIT | Performed by: NURSE PRACTITIONER

## 2025-06-07 RX ORDER — POTASSIUM CHLORIDE 20 MEQ/1
20 TABLET, EXTENDED RELEASE ORAL ONCE
Status: COMPLETED | OUTPATIENT
Start: 2025-06-07 | End: 2025-06-07

## 2025-06-07 RX ORDER — VANCOMYCIN 1.5 G/300ML
1500 INJECTION, SOLUTION INTRAVENOUS EVERY 12 HOURS
Status: DISCONTINUED | OUTPATIENT
Start: 2025-06-07 | End: 2025-06-07

## 2025-06-07 RX ORDER — VANCOMYCIN 1.5 G/300ML
1500 INJECTION, SOLUTION INTRAVENOUS EVERY 12 HOURS
Status: DISCONTINUED | OUTPATIENT
Start: 2025-06-07 | End: 2025-06-08

## 2025-06-07 RX ORDER — KETOROLAC TROMETHAMINE 30 MG/ML
30 INJECTION, SOLUTION INTRAMUSCULAR; INTRAVENOUS EVERY 8 HOURS PRN
Status: DISPENSED | OUTPATIENT
Start: 2025-06-07 | End: 2025-06-09

## 2025-06-07 RX ADMIN — EMPAGLIFLOZIN 25 MG: 10 TABLET, FILM COATED ORAL at 09:13

## 2025-06-07 RX ADMIN — PRAVASTATIN SODIUM 80 MG: 20 TABLET ORAL at 21:01

## 2025-06-07 RX ADMIN — METFORMIN HYDROCHLORIDE 1000 MG: 500 TABLET, EXTENDED RELEASE ORAL at 16:31

## 2025-06-07 RX ADMIN — ACETAMINOPHEN 650 MG: 325 TABLET, FILM COATED ORAL at 21:15

## 2025-06-07 RX ADMIN — Medication 10 MG: at 21:01

## 2025-06-07 RX ADMIN — PIPERACILLIN SODIUM AND TAZOBACTAM SODIUM 4.5 G: 4; .5 INJECTION, SOLUTION INTRAVENOUS at 04:14

## 2025-06-07 RX ADMIN — ACETAMINOPHEN 650 MG: 325 TABLET, FILM COATED ORAL at 01:38

## 2025-06-07 RX ADMIN — SODIUM CHLORIDE 500 ML: 0.9 INJECTION, SOLUTION INTRAVENOUS at 00:22

## 2025-06-07 RX ADMIN — ENOXAPARIN SODIUM 40 MG: 40 INJECTION SUBCUTANEOUS at 09:12

## 2025-06-07 RX ADMIN — POTASSIUM CHLORIDE 20 MEQ: 1500 TABLET, EXTENDED RELEASE ORAL at 10:43

## 2025-06-07 RX ADMIN — INSULIN LISPRO 2 UNITS: 100 INJECTION, SOLUTION INTRAVENOUS; SUBCUTANEOUS at 08:28

## 2025-06-07 RX ADMIN — METFORMIN HYDROCHLORIDE 1000 MG: 500 TABLET, EXTENDED RELEASE ORAL at 09:13

## 2025-06-07 RX ADMIN — VANCOMYCIN 1.5 G: 1.5 INJECTION, SOLUTION INTRAVENOUS at 10:26

## 2025-06-07 RX ADMIN — KETOROLAC TROMETHAMINE 30 MG: 30 INJECTION, SOLUTION INTRAMUSCULAR at 14:54

## 2025-06-07 RX ADMIN — PIPERACILLIN SODIUM AND TAZOBACTAM SODIUM 4.5 G: 4; .5 INJECTION, SOLUTION INTRAVENOUS at 14:18

## 2025-06-07 RX ADMIN — VANCOMYCIN 1.5 G: 1.5 INJECTION, SOLUTION INTRAVENOUS at 21:43

## 2025-06-07 RX ADMIN — PIPERACILLIN SODIUM AND TAZOBACTAM SODIUM 4.5 G: 4; .5 INJECTION, SOLUTION INTRAVENOUS at 20:00

## 2025-06-07 RX ADMIN — KETOROLAC TROMETHAMINE 30 MG: 30 INJECTION, SOLUTION INTRAMUSCULAR at 23:17

## 2025-06-07 ASSESSMENT — PAIN - FUNCTIONAL ASSESSMENT
PAIN_FUNCTIONAL_ASSESSMENT: 0-10

## 2025-06-07 ASSESSMENT — ENCOUNTER SYMPTOMS
GASTROINTESTINAL NEGATIVE: 1
RESPIRATORY NEGATIVE: 1
FEVER: 1
HEMATOLOGIC/LYMPHATIC NEGATIVE: 1
ENDOCRINE NEGATIVE: 1
PSYCHIATRIC NEGATIVE: 1
EYES NEGATIVE: 1
COLOR CHANGE: 1
MYALGIAS: 1
NEUROLOGICAL NEGATIVE: 1

## 2025-06-07 ASSESSMENT — PAIN DESCRIPTION - LOCATION
LOCATION: LEG
LOCATION: LEG

## 2025-06-07 ASSESSMENT — PAIN SCALES - GENERAL
PAINLEVEL_OUTOF10: 3
PAINLEVEL_OUTOF10: 2
PAINLEVEL_OUTOF10: 0 - NO PAIN
PAINLEVEL_OUTOF10: 7
PAINLEVEL_OUTOF10: 5 - MODERATE PAIN

## 2025-06-07 ASSESSMENT — PAIN DESCRIPTION - ORIENTATION
ORIENTATION: RIGHT
ORIENTATION: RIGHT

## 2025-06-07 ASSESSMENT — PAIN DESCRIPTION - DESCRIPTORS: DESCRIPTORS: THROBBING;DISCOMFORT

## 2025-06-07 NOTE — NURSING NOTE
Assumed care of patient. Patient has no complaints. I agree with previous nurse assessment. Nurse will continue to medicate and monitor per MD order.

## 2025-06-07 NOTE — PROGRESS NOTES
Vancomycin Dosing by Pharmacy- INITIAL    Oneil Montoya is a 35 y.o. year old male who Pharmacy has been consulted for vancomycin dosing for cellulitis, skin and soft tissue. Based on the patient's indication and renal status this patient will be dosed based on a goal AUC of 400-600.     Renal function is currently stable.    Visit Vitals  /69 (BP Location: Left arm, Patient Position: Lying)   Pulse (!) 121   Temp 36.6 °C (97.9 °F) (Temporal)   Resp 16        Lab Results   Component Value Date    CREATININE 0.91 2025    CREATININE 0.76 2025        Patient weight is as follows:   Vitals:    25 1709   Weight: 145 kg (320 lb)       Cultures:  No results found for the encounter in last 14 days.        I/O last 3 completed shifts:  In: 4852.1 (33.4 mL/kg) [P.O.:3000; I.V.:252.1 (1.7 mL/kg); IV Piggyback:1600]  Out: - (0 mL/kg)   Weight: 145.1 kg   I/O during current shift:  No intake/output data recorded.    Temp (24hrs), Av.3 °C (99.2 °F), Min:36.6 °C (97.9 °F), Max:38.2 °C (100.8 °F)         Assessment/Plan     Patient will not be given a loading dose.  Will initiate vancomycin maintenance, 1500 mg every 12 hours.    This dosing regimen is predicted by [employer] to result in the following pharmacokinetic parameters:  Regimen: 1500 mg IV every 12 hours.  Start time: 21:09 on 2025  Exposure target: AUC24 (range) 400-600 mg/L.hr   [id number]: 415 mg/L.hr  AUC24,ss: 533 mg/L.hr  Probability of AUC24 > 400: 80 %  Ctrough,ss: 17.8 mg/L  Probability of Ctrough,ss > 20: 39 %      Follow-up level will be ordered on  at 0500 unless clinically indicated sooner.  Will continue to monitor renal function daily while on vancomycin and order serum creatinine at least every 48 hours if not already ordered.  Follow for continued vancomycin needs, clinical response, and signs/symptoms of toxicity.       Lissa Alonso [facility]

## 2025-06-07 NOTE — NURSING NOTE
Received report from ER nurse KAROLINA Ferguson. Patient arrived to floor via gurney. Oriented to floor, call light system, and staff, bed in lowest position, alarm on, will cont to monitor.

## 2025-06-07 NOTE — CARE PLAN
The patient's goals for the shift include      The clinical goals for the shift include pain will be controlled at 5/10 or less tonight    Over the shift, the patient did make progress toward the following goals with out use of any PRN medications.

## 2025-06-07 NOTE — H&P
History Of Present Illness:    Oneil Montoya is a very pleasant 35 year old gentleman with a history of DM and HLD, presented to the ER with right leg swelling and redness. Area is tender to the touch. States he noticed it yesterday. Denies any trauma to the area. He has an ulcer on his big toe on his left foot that is currently being followed. He denies chest pain, shortness of breath or heart palpitations.     Review of Systems   Constitutional: Positive for fever.   HENT: Negative.     Eyes: Negative.    Cardiovascular:  Positive for leg swelling.   Respiratory: Negative.     Endocrine: Negative.    Hematologic/Lymphatic: Negative.    Skin:  Positive for color change.   Musculoskeletal:  Positive for myalgias.   Gastrointestinal: Negative.    Neurological: Negative.    Psychiatric/Behavioral: Negative.           Last Recorded Vitals:  Vitals:    06/07/25 0050 06/07/25 0232 06/07/25 0404 06/07/25 0836   BP: 128/75  117/69 133/89   BP Location: Right arm  Left arm Left arm   Patient Position: Lying  Lying Lying   Pulse: (!) 129 (!) 123 (!) 121 (!) 117   Resp:    18   Temp: (!) 38.2 °C (100.8 °F) 37.3 °C (99.1 °F) 36.6 °C (97.9 °F) 37.2 °C (99 °F)   TempSrc: Temporal  Temporal Temporal   SpO2: 94% 92% 95% 96%   Weight:       Height:           Last Labs:  CBC - 6/7/2025:  8:27 AM  11.3 14.0 150    40.2      CMP - 6/7/2025:  8:27 AM  8.8 7.7 15 --- 1.5   _ 4.1 22 70      PTT - 6/6/2025:  5:54 PM  1.4   15.6 33     Troponin I, High Sensitivity   Date/Time Value Ref Range Status   06/06/2025 07:52 PM 4 0 - 20 ng/L Final   06/06/2025 05:54 PM 4 0 - 20 ng/L Final     POC HEMOGLOBIN A1c   Date/Time Value Ref Range Status   04/15/2025 04:56 PM 8.5 (A) 4.2 - 6.5 % Final     HEMOGLOBIN A1c   Date/Time Value Ref Range Status   03/17/2025 03:49 PM 11.3 (H) <5.7 % of total Hgb Final     Comment:     For someone without known diabetes, a hemoglobin A1c  value of 6.5% or greater indicates that they may have   diabetes and this  "should be confirmed with a follow-up   test.     For someone with known diabetes, a value <7% indicates   that their diabetes is well controlled and a value   greater than or equal to 7% indicates suboptimal   control. A1c targets should be individualized based on   duration of diabetes, age, comorbid conditions, and   other considerations.     Currently, no consensus exists regarding use of  hemoglobin A1c for diagnosis of diabetes for children.           LDL-CHOLESTEROL   Date/Time Value Ref Range Status   03/17/2025 03:49 PM   Final     Comment:        LDL cholesterol not calculated. Triglyceride levels  greater than 400 mg/dL invalidate calculated LDL results.           Reference range: <100     Desirable range <100 mg/dL for primary prevention;    <70 mg/dL for patients with CHD or diabetic patients   with > or = 2 CHD risk factors.     LDL-C is now calculated using the Dougie   calculation, which is a validated novel method providing   better accuracy than the Friedewald equation in the   estimation of LDL-C.   Johnny HUGGINS et al. JUAN. 2013;310(19): 0271-6261   (http://education.[employer]/faq/ZOC388)        Last I/O:  I/O last 3 completed shifts:  In: 4852.1 (33.4 mL/kg) [P.O.:3000; I.V.:252.1 (1.7 mL/kg); IV Piggyback:1600]  Out: - (0 mL/kg)   Weight: 145.1 kg     Past Cardiology Tests (Last 3 Years):  EKG:  No results found for this or any previous visit from the past 1095 days.    Echo:  No results found for this or any previous visit from the past 1095 days.    Ejection Fractions:  No results found for: \"EF\"  Cath:  No results found for this or any previous visit from the past 1095 days.    Stress Test:  No results found for this or any previous visit from the past 1095 days.    Cardiac Imaging:  No results found for this or any previous visit from the past 1095 days.      Past Medical History:  He has a past medical history of Mixed hyperlipidemia (03/20/2025), Skin ulcer of left great " toe (Multi) (03/20/2025), and Uncontrolled type 2 diabetes mellitus with hyperglycemia, without long-term current use of insulin (04/15/2025).    Past Surgical History:  He has no past surgical history on file.      Social History:  He reports that he has never smoked. He has never used smokeless tobacco. He reports that he does not drink alcohol and does not use drugs.    Family History:  Family History[1]     Allergies:  Patient has no known allergies.    Inpatient Medications:  Scheduled Medications[2]  PRN Medications[3]  Continuous Medications[4]  Outpatient Medications:  Current Outpatient Medications   Medication Instructions    empagliflozin (JARDIANCE) 25 mg, oral, Daily    honey (Medihoney) gel topical gel Daily RT    metFORMIN XR (GLUCOPHAGE-XR) 1,000 mg, oral, 2 times daily (morning and late afternoon), Do not crush, chew, or split.    pravastatin (PRAVACHOL) 80 mg, oral, Daily       Physical Exam:  Physical Exam  Vitals reviewed.   HENT:      Head: Normocephalic.      Nose: Nose normal.   Eyes:      Pupils: Pupils are equal, round, and reactive to light.   Cardiovascular:      Rate and Rhythm: Normal rate and regular rhythm.   Pulmonary:      Effort: Pulmonary effort is normal.      Breath sounds: Normal breath sounds.   Abdominal:      General: Abdomen is flat.      Palpations: Abdomen is soft.   Musculoskeletal:         General: Normal range of motion.      Cervical back: Normal range of motion.   Skin:     General: Skin is warm and dry.   Neurological:      General: No focal deficit present.      Mental Status: He is alert and oriented to person, place, and time.   Psychiatric:         Mood and Affect: Mood normal.       Extremities +1-2 pedal edema right leg, calf warm and tender to touch   Left leg big toe wrapped.      Assessment/Plan   Oneil Montoya is a very pleasant 35 year old gentleman with a history of DM and HLD, presented to the ER with right leg swelling and redness. Area is tender to the  touch. States he noticed it yesterday. Denies any trauma to the area. He has an ulcer on his big toe on his left foot that is currently being followed. He denies chest pain, shortness of breath or heart palpitations.    Cellulitis   HLD   DM     Cellulitis   -continue Zosyn and Vancomycin   -negative for a DVT     HLD  -continue Pravastatin     DM  -continue Metformin and Jardiance       GI ppx: PPI  DVT ppx: Enoxaparin  Fluids: As needed  Electrolytes: replace as needed  Nutrition: Regular diet  Adjuncts: PIV  Code Status: Full code  Pt requires inpatient stay at this time.      Peripheral IV 06/06/25 20 G Right Antecubital (Active)   Site Assessment Clean;Dry;Intact 06/07/25 0818   Dressing Type Transparent 06/07/25 0818   Line Status Blood return noted;Flushed;Infusing 06/07/25 0818   Dressing Status Clean;Dry;Occlusive 06/07/25 0818   Number of days: 1       Code Status:  No Order    I spent minutes in the professional and overall care of this patient.        Luzma Gudino, YURY-CNP  Attending Attestation:    Patient was seen and examined face to face, history and physical was taken personally at bedside the APRN-CNP, was present for the whole duration of the exam who participated in the documentation of this note. I performed the medical decision-making components (assessment and plan of care). I have reviewed the documentation and verified the findings in the note as written with additions or exceptions as stated in the body of this note.  Young gentleman recently diagnosed with diabetes, came to the hospital secondary to cellulitis of the right lower extremity.  Was started on vancomycin and Zosyn, patient was seen this morning denies chest pain cough shortness of breath no nausea vomiting, pain in the right lower extremity is improving, on exam heart is regular lungs clear to auscultation, abdomen soft bowel sounds are present there is erythema almost circumferential over the distal part of the right  lower extremity below knee however erythema has not exceeded from the line marked in the emergency room slightly tender to touch no blister no fluctuation.  Patient with cellulitis of the right lower extremity, no significant concern about complicated infection we will send downgrade antibiotics to cefazolin 1 g 3 times daily keep legs elevated, diabetes control    Dr. Bibi Davila MD  Internal Medicine        [1]   Family History  Problem Relation Name Age of Onset    No Known Problems Mother      Diabetes Father      Heart disease Father     [2]   Scheduled medications   Medication Dose Route Frequency    empagliflozin  25 mg oral Daily    enoxaparin  40 mg subcutaneous Daily    honey   Topical Daily    insulin lispro  0-10 Units subcutaneous TID AC    metFORMIN XR  1,000 mg oral BID    piperacillin-tazobactam  4.5 g intravenous q8h    potassium chloride CR  20 mEq oral Once    pravastatin  80 mg oral Nightly    vancomycin  1,500 mg intravenous q12h   [3]   PRN medications   Medication    acetaminophen    alum-mag hydroxide-simeth    benzocaine-menthol    loperamide    melatonin    ondansetron    polyethylene glycol    vancomycin   [4]   Continuous Medications   Medication Dose Last Rate

## 2025-06-07 NOTE — PROGRESS NOTES
Vancomycin Dosing by Pharmacy- INITIAL    Oneil Montoya is a 35 y.o. year old male who Pharmacy has been consulted for vancomycin dosing for cellulitis, skin and soft tissue. Based on the patient's indication and renal status this patient will be dosed based on a goal AUC of 400-600.     Renal function is currently stable.    Visit Vitals  /81 (BP Location: Right arm, Patient Position: Lying)   Pulse (!) 136   Temp 37.2 °C (99 °F) (Temporal)   Resp 16        Lab Results   Component Value Date    CREATININE 0.91 2025    CREATININE 0.76 2025        Patient weight is as follows:   Vitals:    25 1709   Weight: 145 kg (320 lb)       Cultures:  No results found for the encounter in last 14 days.        No intake/output data recorded.  I/O during current shift:  No intake/output data recorded.    Temp (24hrs), Av.3 °C (99.2 °F), Min:37.2 °C (99 °F), Max:37.4 °C (99.3 °F)         Assessment/Plan     Patient will not be given a loading dose.  Will initiate vancomycin maintenance, 1500 mg every 12 hours.    This dosing regimen is predicted by [employer] to result in the following pharmacokinetic parameters:  Regimen: 1500 mg IV every 12 hours.  Start time: 21:09 on 2025  Exposure target: AUC24 (range) 400-600 mg/L.hr   EOH71-25: 415 mg/L.hr  AUC24,ss: 533 mg/L.hr  Probability of AUC24 > 400: 80 %  Ctrough,ss: 17.8 mg/L  Probability of Ctrough,ss > 20: 39 %      Follow-up level will be ordered on  at 0500 unless clinically indicated sooner.  Will continue to monitor renal function daily while on vancomycin and order serum creatinine at least every 48 hours if not already ordered.  Follow for continued vancomycin needs, clinical response, and signs/symptoms of toxicity.       Lissa Alonso [facility]

## 2025-06-07 NOTE — CARE PLAN
The patient's goals for the shift include      The clinical goals for the shift include pt to tolerate IVATB/ remain afebrile this shift    Patient tolerated IV antibiotics patient had no complaints during nursing shift

## 2025-06-08 VITALS
HEART RATE: 100 BPM | SYSTOLIC BLOOD PRESSURE: 142 MMHG | DIASTOLIC BLOOD PRESSURE: 75 MMHG | TEMPERATURE: 98.1 F | RESPIRATION RATE: 16 BRPM | OXYGEN SATURATION: 96 % | WEIGHT: 315 LBS | BODY MASS INDEX: 38.36 KG/M2 | HEIGHT: 76 IN

## 2025-06-08 LAB
ANION GAP SERPL CALC-SCNC: 15 MMOL/L (ref 10–20)
APPEARANCE UR: CLEAR
BACTERIA BLD CULT: NORMAL
BACTERIA BLD CULT: NORMAL
BASOPHILS # BLD AUTO: 0.03 X10*3/UL (ref 0–0.1)
BASOPHILS NFR BLD AUTO: 0.3 %
BILIRUB UR STRIP.AUTO-MCNC: NEGATIVE MG/DL
BUN SERPL-MCNC: 20 MG/DL (ref 6–23)
CALCIUM SERPL-MCNC: 9.3 MG/DL (ref 8.6–10.3)
CHLORIDE SERPL-SCNC: 100 MMOL/L (ref 98–107)
CO2 SERPL-SCNC: 24 MMOL/L (ref 21–32)
COLOR UR: ABNORMAL
CREAT SERPL-MCNC: 0.83 MG/DL (ref 0.5–1.3)
EGFRCR SERPLBLD CKD-EPI 2021: >90 ML/MIN/1.73M*2
EOSINOPHIL # BLD AUTO: 0.05 X10*3/UL (ref 0–0.7)
EOSINOPHIL NFR BLD AUTO: 0.6 %
ERYTHROCYTE [DISTWIDTH] IN BLOOD BY AUTOMATED COUNT: 13.2 % (ref 11.5–14.5)
GLUCOSE BLD MANUAL STRIP-MCNC: 100 MG/DL (ref 74–99)
GLUCOSE BLD MANUAL STRIP-MCNC: 121 MG/DL (ref 74–99)
GLUCOSE BLD MANUAL STRIP-MCNC: 130 MG/DL (ref 74–99)
GLUCOSE BLD MANUAL STRIP-MCNC: 133 MG/DL (ref 74–99)
GLUCOSE SERPL-MCNC: 127 MG/DL (ref 74–99)
GLUCOSE UR STRIP.AUTO-MCNC: ABNORMAL MG/DL
HCT VFR BLD AUTO: 41.6 % (ref 41–52)
HGB BLD-MCNC: 13.9 G/DL (ref 13.5–17.5)
HOLD SPECIMEN: NORMAL
IMM GRANULOCYTES # BLD AUTO: 0.05 X10*3/UL (ref 0–0.7)
IMM GRANULOCYTES NFR BLD AUTO: 0.6 % (ref 0–0.9)
KETONES UR STRIP.AUTO-MCNC: ABNORMAL MG/DL
LEUKOCYTE ESTERASE UR QL STRIP.AUTO: NEGATIVE
LYMPHOCYTES # BLD AUTO: 1.13 X10*3/UL (ref 1.2–4.8)
LYMPHOCYTES NFR BLD AUTO: 12.7 %
MCH RBC QN AUTO: 29.3 PG (ref 26–34)
MCHC RBC AUTO-ENTMCNC: 33.4 G/DL (ref 32–36)
MCV RBC AUTO: 88 FL (ref 80–100)
MONOCYTES # BLD AUTO: 0.85 X10*3/UL (ref 0.1–1)
MONOCYTES NFR BLD AUTO: 9.6 %
MUCOUS THREADS #/AREA URNS AUTO: NORMAL /LPF
NEUTROPHILS # BLD AUTO: 6.79 X10*3/UL (ref 1.2–7.7)
NEUTROPHILS NFR BLD AUTO: 76.2 %
NITRITE UR QL STRIP.AUTO: NEGATIVE
NRBC BLD-RTO: 0 /100 WBCS (ref 0–0)
PH UR STRIP.AUTO: 5.5 [PH]
PLATELET # BLD AUTO: 164 X10*3/UL (ref 150–450)
POTASSIUM SERPL-SCNC: 3.4 MMOL/L (ref 3.5–5.3)
PROT UR STRIP.AUTO-MCNC: ABNORMAL MG/DL
RBC # BLD AUTO: 4.74 X10*6/UL (ref 4.5–5.9)
RBC # UR STRIP.AUTO: NEGATIVE MG/DL
RBC #/AREA URNS AUTO: NORMAL /HPF
SODIUM SERPL-SCNC: 136 MMOL/L (ref 136–145)
SP GR UR STRIP.AUTO: 1.02
SQUAMOUS #/AREA URNS AUTO: NORMAL /HPF
UROBILINOGEN UR STRIP.AUTO-MCNC: NORMAL MG/DL
VANCOMYCIN SERPL-MCNC: 7.4 UG/ML (ref 5–20)
WBC # BLD AUTO: 8.9 X10*3/UL (ref 4.4–11.3)
WBC #/AREA URNS AUTO: NORMAL /HPF

## 2025-06-08 PROCEDURE — 94760 N-INVAS EAR/PLS OXIMETRY 1: CPT | Mod: IPSPLIT

## 2025-06-08 PROCEDURE — 2500000004 HC RX 250 GENERAL PHARMACY W/ HCPCS (ALT 636 FOR OP/ED): Mod: IPSPLIT | Performed by: NURSE PRACTITIONER

## 2025-06-08 PROCEDURE — 81001 URINALYSIS AUTO W/SCOPE: CPT | Mod: IPSPLIT | Performed by: INTERNAL MEDICINE

## 2025-06-08 PROCEDURE — 82947 ASSAY GLUCOSE BLOOD QUANT: CPT | Mod: IPSPLIT

## 2025-06-08 PROCEDURE — 2500000002 HC RX 250 W HCPCS SELF ADMINISTERED DRUGS (ALT 637 FOR MEDICARE OP, ALT 636 FOR OP/ED): Mod: IPSPLIT | Performed by: NURSE PRACTITIONER

## 2025-06-08 PROCEDURE — 99232 SBSQ HOSP IP/OBS MODERATE 35: CPT | Performed by: NURSE PRACTITIONER

## 2025-06-08 PROCEDURE — 85025 COMPLETE CBC W/AUTO DIFF WBC: CPT | Mod: IPSPLIT | Performed by: NURSE PRACTITIONER

## 2025-06-08 PROCEDURE — 2500000004 HC RX 250 GENERAL PHARMACY W/ HCPCS (ALT 636 FOR OP/ED): Mod: JZ,IPSPLIT | Performed by: NURSE PRACTITIONER

## 2025-06-08 PROCEDURE — 2500000004 HC RX 250 GENERAL PHARMACY W/ HCPCS (ALT 636 FOR OP/ED): Mod: IPSPLIT | Performed by: INTERNAL MEDICINE

## 2025-06-08 PROCEDURE — 1200000002 HC GENERAL ROOM WITH TELEMETRY DAILY: Mod: IPSPLIT

## 2025-06-08 PROCEDURE — 2500000004 HC RX 250 GENERAL PHARMACY W/ HCPCS (ALT 636 FOR OP/ED): Mod: IPSPLIT | Performed by: HOSPITALIST

## 2025-06-08 PROCEDURE — 36415 COLL VENOUS BLD VENIPUNCTURE: CPT | Mod: IPSPLIT | Performed by: NURSE PRACTITIONER

## 2025-06-08 PROCEDURE — 2500000005 HC RX 250 GENERAL PHARMACY W/O HCPCS: Mod: IPSPLIT | Performed by: INTERNAL MEDICINE

## 2025-06-08 PROCEDURE — 80048 BASIC METABOLIC PNL TOTAL CA: CPT | Mod: IPSPLIT | Performed by: NURSE PRACTITIONER

## 2025-06-08 PROCEDURE — 2500000001 HC RX 250 WO HCPCS SELF ADMINISTERED DRUGS (ALT 637 FOR MEDICARE OP): Mod: IPSPLIT | Performed by: HOSPITALIST

## 2025-06-08 PROCEDURE — 80202 ASSAY OF VANCOMYCIN: CPT | Mod: IPSPLIT | Performed by: INTERNAL MEDICINE

## 2025-06-08 RX ORDER — VANCOMYCIN 2 G/400ML
2 INJECTION, SOLUTION INTRAVENOUS EVERY 12 HOURS
Status: DISCONTINUED | OUTPATIENT
Start: 2025-06-08 | End: 2025-06-08

## 2025-06-08 RX ORDER — POTASSIUM CHLORIDE 20 MEQ/1
20 TABLET, EXTENDED RELEASE ORAL ONCE
Status: COMPLETED | OUTPATIENT
Start: 2025-06-08 | End: 2025-06-08

## 2025-06-08 RX ORDER — CEFAZOLIN SODIUM 1 G/50ML
1 SOLUTION INTRAVENOUS EVERY 8 HOURS
Status: DISPENSED | OUTPATIENT
Start: 2025-06-08

## 2025-06-08 RX ORDER — SODIUM CHLORIDE 9 MG/ML
10 INJECTION, SOLUTION INTRAVENOUS CONTINUOUS PRN
Status: DISPENSED | OUTPATIENT
Start: 2025-06-08 | End: 2026-06-08

## 2025-06-08 RX ADMIN — Medication: at 16:24

## 2025-06-08 RX ADMIN — CEFAZOLIN SODIUM 1 G: 1 SOLUTION INTRAVENOUS at 19:40

## 2025-06-08 RX ADMIN — PRAVASTATIN SODIUM 80 MG: 20 TABLET ORAL at 20:15

## 2025-06-08 RX ADMIN — EMPAGLIFLOZIN 25 MG: 10 TABLET, FILM COATED ORAL at 08:25

## 2025-06-08 RX ADMIN — KETOROLAC TROMETHAMINE 30 MG: 30 INJECTION, SOLUTION INTRAMUSCULAR at 11:24

## 2025-06-08 RX ADMIN — POTASSIUM CHLORIDE 20 MEQ: 1500 TABLET, EXTENDED RELEASE ORAL at 11:34

## 2025-06-08 RX ADMIN — KETOROLAC TROMETHAMINE 30 MG: 30 INJECTION, SOLUTION INTRAMUSCULAR at 19:38

## 2025-06-08 RX ADMIN — METFORMIN HYDROCHLORIDE 1000 MG: 500 TABLET, EXTENDED RELEASE ORAL at 17:24

## 2025-06-08 RX ADMIN — CEFAZOLIN SODIUM 1 G: 1 SOLUTION INTRAVENOUS at 11:15

## 2025-06-08 RX ADMIN — METFORMIN HYDROCHLORIDE 1000 MG: 500 TABLET, EXTENDED RELEASE ORAL at 08:25

## 2025-06-08 RX ADMIN — PIPERACILLIN SODIUM AND TAZOBACTAM SODIUM 4.5 G: 4; .5 INJECTION, SOLUTION INTRAVENOUS at 04:46

## 2025-06-08 RX ADMIN — SODIUM CHLORIDE 10 ML/HR: 0.9 INJECTION, SOLUTION INTRAVENOUS at 11:16

## 2025-06-08 RX ADMIN — ENOXAPARIN SODIUM 40 MG: 40 INJECTION SUBCUTANEOUS at 08:25

## 2025-06-08 ASSESSMENT — PAIN DESCRIPTION - DESCRIPTORS: DESCRIPTORS: ACHING;THROBBING

## 2025-06-08 ASSESSMENT — PAIN SCALES - GENERAL
PAINLEVEL_OUTOF10: 6
PAINLEVEL_OUTOF10: 6
PAINLEVEL_OUTOF10: 5 - MODERATE PAIN
PAINLEVEL_OUTOF10: 7

## 2025-06-08 ASSESSMENT — PAIN - FUNCTIONAL ASSESSMENT
PAIN_FUNCTIONAL_ASSESSMENT: 0-10

## 2025-06-08 NOTE — PROGRESS NOTES
Subjective Data:  Mr. Montoya resting comfortably in bed. Swelling has improved a little. Pain has improved with Toradol. Denies chest pain, shortness of breath or heart palpitations. Heart rate has improved.     Overnight Events:         Objective Data:  Last Recorded Vitals:  Vitals:    06/08/25 0015 06/08/25 0200 06/08/25 0430 06/08/25 0854   BP: 109/72  123/79 142/86   BP Location: Left arm  Left arm Right arm   Patient Position: Lying  Lying Sitting   Pulse: 86 87 92 (!) 112   Resp: 18  18 18   Temp: 36.2 °C (97.2 °F)  36.4 °C (97.5 °F) 36.7 °C (98.1 °F)   TempSrc: Temporal  Temporal Temporal   SpO2: 94% 95% 97% 97%   Weight:       Height:           Last Labs:  CBC - 6/8/2025:  8:44 AM  8.9 13.9 164    41.6      CMP - 6/8/2025:  8:44 AM  9.3 7.7 15 --- 1.5   _ 4.1 22 70      PTT - 6/6/2025:  5:54 PM  1.4   15.6 33     TROPHS   Date/Time Value Ref Range Status   06/06/2025 07:52 PM 4 0 - 20 ng/L Final   06/06/2025 05:54 PM 4 0 - 20 ng/L Final     HGBA1C   Date/Time Value Ref Range Status   04/15/2025 04:56 PM 8.5 4.2 - 6.5 % Final   03/17/2025 03:49 PM 11.3 <5.7 % of total Hgb Final     Comment:     For someone without known diabetes, a hemoglobin A1c  value of 6.5% or greater indicates that they may have   diabetes and this should be confirmed with a follow-up   test.     For someone with known diabetes, a value <7% indicates   that their diabetes is well controlled and a value   greater than or equal to 7% indicates suboptimal   control. A1c targets should be individualized based on   duration of diabetes, age, comorbid conditions, and   other considerations.     Currently, no consensus exists regarding use of  hemoglobin A1c for diagnosis of diabetes for children.           LDLCALC   Date/Time Value Ref Range Status   03/17/2025 03:49 PM   Final     Comment:        LDL cholesterol not calculated. Triglyceride levels  greater than 400 mg/dL invalidate calculated LDL results.           Reference range: <100    "  Desirable range <100 mg/dL for primary prevention;    <70 mg/dL for patients with CHD or diabetic patients   with > or = 2 CHD risk factors.     LDL-C is now calculated using the Dougie   calculation, which is a validated novel method providing   better accuracy than the Friedewald equation in the   estimation of LDL-C.   Johnny HUGGINS et al. JUAN. 2013;310(19): 0045-1171   (http://education.[employer]/faq/TQW154)        Last I/O:  I/O last 3 completed shifts:  In: 5672.1 (39.1 mL/kg) [P.O.:3220; I.V.:252.1 (1.7 mL/kg); IV Piggyback:2200]  Out: - (0 mL/kg)   Weight: 145.1 kg     Past Cardiology Tests (Last 3 Years):  EKG:  No results found for this or any previous visit from the past 1095 days.    Echo:  No results found for this or any previous visit from the past 1095 days.    Ejection Fractions:  No results found for: \"EF\"  Cath:  No results found for this or any previous visit from the past 1095 days.    Stress Test:  No results found for this or any previous visit from the past 1095 days.    Cardiac Imaging:  No results found for this or any previous visit from the past 1095 days.      Inpatient Medications:  Scheduled Medications[1]  PRN Medications[2]  Continuous Medications[3]    Physical Exam:  Physical Exam  Vitals reviewed.   HENT:      Head: Normocephalic.      Nose: Nose normal.   Eyes:      Pupils: Pupils are equal, round, and reactive to light.   Cardiovascular:      Rate and Rhythm: Normal rate and regular rhythm.   Pulmonary:      Effort: Pulmonary effort is normal.      Breath sounds: Normal breath sounds.   Abdominal:      General: Abdomen is flat.      Palpations: Abdomen is soft.   Musculoskeletal:         General: Normal range of motion.      Cervical back: Normal range of motion.   Skin:     General: Skin is warm and dry.   Neurological:      General: No focal deficit present.      Mental Status: He is alert and oriented to person, place, and time.   Psychiatric:         Mood " and Affect: Mood normal.      Extremities Right leg swollen red, warm to touch       Assessment/Plan   Oneil Montoya is a very pleasant 35 year old gentleman with a history of DM and HLD, presented to the ER with right leg swelling and redness. Area is tender to the touch. States he noticed it yesterday. Denies any trauma to the area. He has an ulcer on his big toe on his left foot that is currently being followed. He denies chest pain, shortness of breath or heart palpitations. Heart rate has improved. Swelling has improved      Cellulitis   HLD   DM      Cellulitis   -continue Zosyn and Vancomycin transition to Cefazolin one gram three times a day   -negative for a DVT   -ID consult appreciate rec      HLD  -continue Pravastatin      DM  -continue Metformin and Jardiance         GI ppx: PPI  DVT ppx: Enoxaparin  Fluids: As needed  Electrolytes: replace as needed  Nutrition: Regular diet  Adjuncts: PIV  Code Status: Full code  Pt requires inpatient stay at this time.     Peripheral IV 06/06/25 20 G Right Antecubital (Active)   Site Assessment Clean;Dry;Intact 06/07/25 2100   Dressing Status Clean;Dry 06/07/25 2100   Number of days: 2       Code Status:  No Order    I spent  minutes in the professional and overall care of this patient.        Luzma Gudino, APRN-CNP       [1]   Scheduled medications   Medication Dose Route Frequency    empagliflozin  25 mg oral Daily    enoxaparin  40 mg subcutaneous Daily    honey   Topical Daily    insulin lispro  0-10 Units subcutaneous TID AC    metFORMIN XR  1,000 mg oral BID    piperacillin-tazobactam  4.5 g intravenous q8h    pravastatin  80 mg oral Nightly    vancomycin  2 g intravenous q12h   [2]   PRN medications   Medication    acetaminophen    alum-mag hydroxide-simeth    benzocaine-menthol    ketorolac    loperamide    melatonin    ondansetron    polyethylene glycol    vancomycin   [3]   Continuous Medications   Medication Dose Last Rate

## 2025-06-08 NOTE — CARE PLAN
The patient's goals for the shift include      The clinical goals for the shift include patient will tolerate IV antibiotics this shift    Over the shift, the patient did make progress toward the following goals.     Problem: Pain - Adult  Goal: Verbalizes/displays adequate comfort level or baseline comfort level  Outcome: Progressing

## 2025-06-08 NOTE — PROGRESS NOTES
Vancomycin Dosing by Pharmacy- FOLLOW UP    Oneil Montoya is a 35 y.o. year old male who Pharmacy has been consulted for vancomycin dosing for cellulitis, skin and soft tissue. Based on the patient's indication and renal status this patient is being dosed based on a goal AUC of 400-600.     Renal function is currently stable.    Current vancomycin dose: 1500 mg given every 12 hours    Estimated vancomycin AUC on current dose: 352 mg/L.hr     Visit Vitals  /86 (BP Location: Right arm, Patient Position: Sitting)   Pulse (!) 112   Temp 36.7 °C (98.1 °F) (Temporal)   Resp 18        Lab Results   Component Value Date    CREATININE 0.83 2025    CREATININE 0.87 2025    CREATININE 0.91 2025    CREATININE 0.76 2025        Patient weight is as follows:   Vitals:    25 1709   Weight: 145 kg (320 lb)       Cultures:  No results found for the encounter in last 14 days.       I/O last 3 completed shifts:  In: 5672.1 (39.1 mL/kg) [P.O.:3220; I.V.:252.1 (1.7 mL/kg); IV Piggyback:2200]  Out: - (0 mL/kg)   Weight: 145.1 kg   I/O during current shift:  I/O this shift:  In: 240 [P.O.:240]  Out: -     Temp (24hrs), Av.6 °C (97.9 °F), Min:36.2 °C (97.2 °F), Max:37 °C (98.6 °F)      Assessment/Plan    Below goal AUC. Orders placed for new vancomcyin regimen of 2000mg every 12 hours to begin at ~1000 today.     This dosing regimen is predicted by [employer] to result in the following pharmacokinetic parameters:  Loading dose: N/A  Regimen: 2000 mg IV every 12 hours.  Start time: 09:47 on 2025  Exposure target: AUC24 (range) 400-600 mg/L.hr   FKH89-18: 457 mg/L.hr  AUC24,ss: 468 mg/L.hr  Probability of AUC24 > 400: 82 %  Ctrough,ss: 11.8 mg/L  Probability of Ctrough,ss > 20: 2 %    The next level will be obtained on  at 0500. May be obtained sooner if clinically indicated.   Will continue to monitor renal function daily while on vancomycin and order serum creatinine at least every 48 hours if  not already ordered.  Follow for continued vancomycin needs, clinical response, and signs/symptoms of toxicity.       Carlie Saldana, PharmD

## 2025-06-08 NOTE — PROGRESS NOTES
Vancomycin Dosing by Pharmacy- Cessation of Therapy    Consult to pharmacy for vancomycin dosing has been discontinued by the prescriber, pharmacy will sign off at this time.    Please call pharmacy if there are further questions or re-enter a consult if vancomycin is resumed.     Carlie Saldana, GloriaD

## 2025-06-09 VITALS
HEIGHT: 76 IN | HEART RATE: 105 BPM | RESPIRATION RATE: 16 BRPM | BODY MASS INDEX: 38.36 KG/M2 | WEIGHT: 315 LBS | OXYGEN SATURATION: 97 % | DIASTOLIC BLOOD PRESSURE: 83 MMHG | SYSTOLIC BLOOD PRESSURE: 152 MMHG | TEMPERATURE: 97.7 F

## 2025-06-09 LAB
ANION GAP SERPL CALC-SCNC: 15 MMOL/L (ref 10–20)
BASOPHILS # BLD AUTO: 0.01 X10*3/UL (ref 0–0.1)
BASOPHILS NFR BLD AUTO: 0.1 %
BUN SERPL-MCNC: 20 MG/DL (ref 6–23)
CALCIUM SERPL-MCNC: 8.7 MG/DL (ref 8.6–10.3)
CHLORIDE SERPL-SCNC: 103 MMOL/L (ref 98–107)
CO2 SERPL-SCNC: 23 MMOL/L (ref 21–32)
CREAT SERPL-MCNC: 0.7 MG/DL (ref 0.5–1.3)
EGFRCR SERPLBLD CKD-EPI 2021: >90 ML/MIN/1.73M*2
EOSINOPHIL # BLD AUTO: 0.04 X10*3/UL (ref 0–0.7)
EOSINOPHIL NFR BLD AUTO: 0.6 %
ERYTHROCYTE [DISTWIDTH] IN BLOOD BY AUTOMATED COUNT: 13 % (ref 11.5–14.5)
GLUCOSE BLD MANUAL STRIP-MCNC: 131 MG/DL (ref 74–99)
GLUCOSE SERPL-MCNC: 122 MG/DL (ref 74–99)
HCT VFR BLD AUTO: 37.2 % (ref 41–52)
HGB BLD-MCNC: 12.5 G/DL (ref 13.5–17.5)
IMM GRANULOCYTES # BLD AUTO: 0.03 X10*3/UL (ref 0–0.7)
IMM GRANULOCYTES NFR BLD AUTO: 0.4 % (ref 0–0.9)
LYMPHOCYTES # BLD AUTO: 1.23 X10*3/UL (ref 1.2–4.8)
LYMPHOCYTES NFR BLD AUTO: 17.9 %
MCH RBC QN AUTO: 29.1 PG (ref 26–34)
MCHC RBC AUTO-ENTMCNC: 33.6 G/DL (ref 32–36)
MCV RBC AUTO: 87 FL (ref 80–100)
MONOCYTES # BLD AUTO: 0.78 X10*3/UL (ref 0.1–1)
MONOCYTES NFR BLD AUTO: 11.3 %
NEUTROPHILS # BLD AUTO: 4.8 X10*3/UL (ref 1.2–7.7)
NEUTROPHILS NFR BLD AUTO: 69.7 %
NRBC BLD-RTO: 0 /100 WBCS (ref 0–0)
PLATELET # BLD AUTO: 156 X10*3/UL (ref 150–450)
POTASSIUM SERPL-SCNC: 3.5 MMOL/L (ref 3.5–5.3)
RBC # BLD AUTO: 4.29 X10*6/UL (ref 4.5–5.9)
SODIUM SERPL-SCNC: 137 MMOL/L (ref 136–145)
WBC # BLD AUTO: 6.9 X10*3/UL (ref 4.4–11.3)

## 2025-06-09 PROCEDURE — 2500000001 HC RX 250 WO HCPCS SELF ADMINISTERED DRUGS (ALT 637 FOR MEDICARE OP): Mod: IPSPLIT | Performed by: HOSPITALIST

## 2025-06-09 PROCEDURE — 99239 HOSP IP/OBS DSCHRG MGMT >30: CPT | Performed by: NURSE PRACTITIONER

## 2025-06-09 PROCEDURE — 36415 COLL VENOUS BLD VENIPUNCTURE: CPT | Mod: IPSPLIT | Performed by: NURSE PRACTITIONER

## 2025-06-09 PROCEDURE — 2500000004 HC RX 250 GENERAL PHARMACY W/ HCPCS (ALT 636 FOR OP/ED): Mod: IPSPLIT | Performed by: INTERNAL MEDICINE

## 2025-06-09 PROCEDURE — 94760 N-INVAS EAR/PLS OXIMETRY 1: CPT | Mod: IPSPLIT

## 2025-06-09 PROCEDURE — 82947 ASSAY GLUCOSE BLOOD QUANT: CPT | Mod: IPSPLIT

## 2025-06-09 PROCEDURE — 80048 BASIC METABOLIC PNL TOTAL CA: CPT | Mod: IPSPLIT | Performed by: NURSE PRACTITIONER

## 2025-06-09 PROCEDURE — 2500000004 HC RX 250 GENERAL PHARMACY W/ HCPCS (ALT 636 FOR OP/ED): Mod: IPSPLIT | Performed by: NURSE PRACTITIONER

## 2025-06-09 PROCEDURE — 85025 COMPLETE CBC W/AUTO DIFF WBC: CPT | Mod: IPSPLIT | Performed by: NURSE PRACTITIONER

## 2025-06-09 PROCEDURE — 2500000004 HC RX 250 GENERAL PHARMACY W/ HCPCS (ALT 636 FOR OP/ED): Mod: IPSPLIT | Performed by: HOSPITALIST

## 2025-06-09 RX ORDER — CEPHALEXIN 500 MG/1
500 CAPSULE ORAL 2 TIMES DAILY
Qty: 20 CAPSULE | Refills: 0 | Status: SHIPPED | OUTPATIENT
Start: 2025-06-09 | End: 2025-06-19

## 2025-06-09 RX ADMIN — ACETAMINOPHEN 650 MG: 325 TABLET, FILM COATED ORAL at 05:42

## 2025-06-09 RX ADMIN — SODIUM CHLORIDE 10 ML/HR: 0.9 INJECTION, SOLUTION INTRAVENOUS at 02:44

## 2025-06-09 RX ADMIN — METFORMIN HYDROCHLORIDE 1000 MG: 500 TABLET, EXTENDED RELEASE ORAL at 09:06

## 2025-06-09 RX ADMIN — Medication: at 12:01

## 2025-06-09 RX ADMIN — EMPAGLIFLOZIN 25 MG: 10 TABLET, FILM COATED ORAL at 09:06

## 2025-06-09 RX ADMIN — ENOXAPARIN SODIUM 40 MG: 40 INJECTION SUBCUTANEOUS at 09:06

## 2025-06-09 RX ADMIN — CEFAZOLIN SODIUM 1 G: 1 SOLUTION INTRAVENOUS at 02:44

## 2025-06-09 ASSESSMENT — ENCOUNTER SYMPTOMS
FEVER: 1
WOUND: 1
CHILLS: 1

## 2025-06-09 ASSESSMENT — ACTIVITIES OF DAILY LIVING (ADL): LACK_OF_TRANSPORTATION: NO

## 2025-06-09 ASSESSMENT — PAIN - FUNCTIONAL ASSESSMENT: PAIN_FUNCTIONAL_ASSESSMENT: 0-10

## 2025-06-09 ASSESSMENT — PAIN SCALES - GENERAL: PAINLEVEL_OUTOF10: 3

## 2025-06-09 NOTE — CONSULTS
Consults      Primary MD: Cindi Vergara MD    Reason For Consult  Right leg cellulitis    History Of Present Illness  Oneil Montoya is a 35 y.o. male presenting with pain swelling and redness of right leg.  He has background history of type 2 diabetes.  Onset was on day prior to presentation.  He had malaise, fatigue and fever but on the next day he noticed pain swelling and redness of his right leg.  He came to the hospital for further evaluation and management.  He was febrile and tachycardic in the emergency room.  He was started on empiric antibiotic therapy.  He reports interval improvement.  He is able to ambulate without difficulty.  He was on vancomycin and cefazolin, but is now on cefazolin.  He got a dose of Zosyn during this admission.       Past Medical History  He has a past medical history of Mixed hyperlipidemia (03/20/2025), Skin ulcer of left great toe (Multi) (03/20/2025), and Uncontrolled type 2 diabetes mellitus with hyperglycemia, without long-term current use of insulin (04/15/2025).    Surgical History  He has no past surgical history on file.     Social History     Occupational History    Not on file   Tobacco Use    Smoking status: Never    Smokeless tobacco: Never   Vaping Use    Vaping status: Never Used   Substance and Sexual Activity    Alcohol use: Never    Drug use: Never    Sexual activity: Not on file     Travel History   Travel since 05/09/25    No documented travel since 05/09/25           Family History  Family History[1]  Allergies  Patient has no known allergies.     Immunization History   Administered Date(s) Administered    MMR vaccine, subcutaneous (MMR II) 07/05/2000    Pneumococcal conjugate vaccine, 20-valent (PREVNAR 20) 04/15/2025     Medications  Home medications:  Prescriptions Prior to Admission[2]  Current medications:  Scheduled medications  Scheduled Medications[3]  Continuous medications  Continuous Medications[4]  PRN medications  PRN Medications[5]    Review of  Systems   Constitutional:  Positive for chills and fever.   Cardiovascular:  Positive for leg swelling.   Skin:  Positive for rash and wound.   All other systems reviewed and are negative.       Objective  Range of Vitals (last 24 hours)  Heart Rate:  []   Temp:  [35.9 °C (96.6 °F)-36.7 °C (98.1 °F)]   Resp:  [16-18]   BP: (114-142)/(63-88)   SpO2:  [96 %-98 %]   Daily Weight  06/06/25 : 145 kg (320 lb)    Body mass index is 38.95 kg/m².     Physical Exam  Constitutional:       Appearance: Normal appearance.   HENT:      Head: Normocephalic and atraumatic.      Right Ear: External ear normal.      Left Ear: External ear normal.      Nose: Nose normal.   Eyes:      General: No scleral icterus.     Extraocular Movements: Extraocular movements intact.      Conjunctiva/sclera: Conjunctivae normal.   Cardiovascular:      Rate and Rhythm: Regular rhythm. Tachycardia present.      Heart sounds: Normal heart sounds.   Pulmonary:      Effort: Pulmonary effort is normal.      Breath sounds: Normal breath sounds.   Abdominal:      General: Bowel sounds are normal.      Palpations: Abdomen is soft.      Tenderness: There is no abdominal tenderness.   Musculoskeletal:      Cervical back: Normal range of motion and neck supple.      Right lower leg: Edema present.      Left lower leg: No edema.   Feet:      Left foot:      Skin integrity: Ulcer present.      Comments: Left hallux distal phalanx plantar ulcer with large amount of granulation tissue   Skin:     General: Skin is warm and dry.      Comments: Right leg erythema, mild warmth and tenderness   Neurological:      Mental Status: He is alert and oriented to person, place, and time.   Psychiatric:         Behavior: Behavior normal. Behavior is cooperative.        Relevant Results  Outside Hospital Results      Labs  Results from last 72 hours   Lab Units 06/09/25  0815 06/08/25  0844 06/07/25  0827   WBC AUTO x10*3/uL 6.9 8.9 11.3   HEMOGLOBIN g/dL 12.5* 13.9 14.0  "  HEMATOCRIT % 37.2* 41.6 40.2*   PLATELETS AUTO x10*3/uL 156 164 150   NEUTROS PCT AUTO % 69.7 76.2 80.5   LYMPHS PCT AUTO % 17.9 12.7 11.2   MONOS PCT AUTO % 11.3 9.6 7.4   EOS PCT AUTO % 0.6 0.6 0.0     Results from last 72 hours   Lab Units 06/09/25  0815 06/08/25  0844 06/07/25  0827   SODIUM mmol/L 137 136 134*   POTASSIUM mmol/L 3.5 3.4* 3.2*   CHLORIDE mmol/L 103 100 99   CO2 mmol/L 23 24 25   BUN mg/dL 20 20 14   CREATININE mg/dL 0.70 0.83 0.87   GLUCOSE mg/dL 122* 127* 208*   CALCIUM mg/dL 8.7 9.3 8.8   ANION GAP mmol/L 15 15 13   EGFR mL/min/1.73m*2 >90 >90 >90     Results from last 72 hours   Lab Units 06/06/25  1754   ALK PHOS U/L 70   BILIRUBIN TOTAL mg/dL 1.5*   PROTEIN TOTAL g/dL 7.7   ALT U/L 22   AST U/L 15   ALBUMIN g/dL 4.1     Estimated Creatinine Clearance: 125 mL/min (by C-G formula based on SCr of 0.7 mg/dL).  C-Reactive Protein   Date Value Ref Range Status   06/06/2025 27.18 (H) <1.00 mg/dL Final     Sedimentation Rate   Date Value Ref Range Status   06/06/2025 10 0 - 15 mm/h Final     No results found for: \"HIV1X2\", \"HIVCONF\", \"ULMTVX9KJ\"  No results found for: \"HEPCABINIT\", \"HEPCAB\", \"HCVPCRQUANT\"  Microbiology  Reviewed-blood cultures pending  Imaging  ECG 12 lead  Result Date: 6/9/2025  Sinus tachycardia Poor R-wave progression ; consider anterior infarct, lead placement, or normal variant Abnormal ECG No previous ECGs available    XR tibia fibula right 2 views  Result Date: 6/6/2025  STUDY: Tibia and Fibula Radiographs; 6/6/2025 5:55 PM INDICATION: Right leg pain and anterior shin swelling.  Suspect thrombophlebitis, cellulitis. COMPARISON: None Available. ACCESSION NUMBER(S): [id number] ORDERING CLINICIAN: NILE ALFARO TECHNIQUE:  Two view(s) of the right tibia and fibula (five images). FINDINGS:  There is no displaced fracture.  The alignment is anatomic.  No soft tissue abnormality is seen.    No acute bony abnormalities. Signed by Billy Palumbo MD    Lower extremity venous " duplex right  Result Date: 6/6/2025  STUDY: Right lower extremity venous ultrasound; Completed Time: 6/6/2025 17:50 INDICATION: RLE pain, redness and edema. COMPARISON: None available. ACCESSION NUMBER(S): [id number] ORDERING CLINICIAN: NILE ALFARO TECHNIQUE: Ultrasound of the right lower extremity veins.  33 DICOM images and 1 cine clip received. FINDINGS: The right common femoral, femoral, and popliteal veins demonstrated normal compressibility, normal phasic venous flow, and normal response to augmentation.  There is no evidence for echogenic thrombi.  The visualized deep calf veins are patent. The contralateral common femoral vein is free of thrombosis.    No evidence for DVT within the right lower extremity.  Signed by Darshan Krishnamurthy MD     Assessment/Plan   Right leg cellulitis, most likely streptococcal, resolving  Type 2 diabetes with peripheral angiopathy without gangrene-left hallux diabetic foot ulcer, Phelps 2  Left hallux plantar ulcer,healing     Continue IV cefazolin  Monitor response to therapy  Local care of left hallux ulcer  Discharge planning  Monitor temperature no reason  Plan discussed with primary attending-discharge on cephalexin to complete total of 14 days    This is a complex infectious disease issue and the following was performed today (for more details please see the above note): Management decisions reflecting the added complexity (e.g., changes in antimicrobial therapy, infection control strategies).     Say Long MD         [1]   Family History  Problem Relation Name Age of Onset    No Known Problems Mother      Diabetes Father      Heart disease Father     [2]   Medications Prior to Admission   Medication Sig Dispense Refill Last Dose/Taking    empagliflozin (Jardiance) 25 mg tablet Take 1 tablet (25 mg) by mouth once daily. 90 tablet 0 Past Month    metFORMIN XR (Glucophage-XR) 500 mg 24 hr tablet Take 2 tablets (1,000 mg) by mouth 2 times daily (morning and late  afternoon). Do not crush, chew, or split. 360 tablet 0 Past Month    pravastatin (Pravachol) 80 mg tablet Take 1 tablet (80 mg) by mouth once daily. 90 tablet 0 Past Month    honey (Medihoney) gel topical gel Apply topically once daily.   Unknown   [3] ceFAZolin, 1 g, intravenous, q8h  empagliflozin, 25 mg, oral, Daily  enoxaparin, 40 mg, subcutaneous, Daily  honey, , Topical, Daily  insulin lispro, 0-10 Units, subcutaneous, TID AC  metFORMIN XR, 1,000 mg, oral, BID  pravastatin, 80 mg, oral, Nightly    [4] sodium chloride 0.9%, 10 mL/hr, Last Rate: 10 mL/hr (06/09/25 0254)    [5] PRN medications: acetaminophen, alum-mag hydroxide-simeth, benzocaine-menthol, ketorolac, loperamide, melatonin, ondansetron, polyethylene glycol, sodium chloride 0.9%

## 2025-06-09 NOTE — CARE PLAN
The patient's goals for the shift include  decreased pain and swelling     The clinical goals for the shift include Patient will report managed pain this shift      Problem: Pain - Adult  Goal: Verbalizes/displays adequate comfort level or baseline comfort level  Outcome: Progressing     Problem: Discharge Planning  Goal: Discharge to home or other facility with appropriate resources  Outcome: Progressing     Problem: Chronic Conditions and Co-morbidities  Goal: Patient's chronic conditions and co-morbidity symptoms are monitored and maintained or improved  Outcome: Progressing     Problem: Nutrition  Goal: Nutrient intake appropriate for maintaining nutritional needs  Outcome: Progressing     Problem: Diabetes  Goal: Achieve decreasing blood glucose levels by end of shift  Outcome: Progressing  Goal: Increase stability of blood glucose readings by end of shift  Outcome: Progressing  Goal: Decrease in ketones present in urine by end of shift  Outcome: Progressing  Goal: Maintain electrolyte levels within acceptable range throughout shift  Outcome: Progressing  Goal: Maintain glucose levels >70mg/dl to <250mg/dl throughout shift  Outcome: Progressing  Goal: No changes in neurological exam by end of shift  Outcome: Progressing  Goal: Learn about and adhere to nutrition recommendations by end of shift  Outcome: Progressing  Goal: Vital signs within normal range for age by end of shift  Outcome: Progressing  Goal: Increase self care and/or family involovement by end of shift  Outcome: Progressing  Goal: Receive DSME education by end of shift  Outcome: Progressing

## 2025-06-09 NOTE — DISCHARGE INSTR - DIET
Free Diabetes Education Programs      Please join [facility] and [facility] for a free diabetes education program.    You will learn about lifestyle strategies to prevent and manage this disease through nutrition, exercise and weight management, and the many treatment options now available. More than  21 million Americans have diabetes. Armed with the most complete and current information, people with diabetes can live long, healthy, happy lives.   [facility] offers individual counseling, group classes and nutrition and meal preparation demonstrations.    For more information, please contact Juliana Rivera RN, M.Kevan., [facility], at [phone] or Pema Espinosa RN, at [phone].       Plate Method for Diabetes  Foods with carbohydrates make your blood glucose level go up. The plate method is a simple way to meal plan and control the amount of carbohydrate you eat.           Use the following guidance to build a healthy plate to control carbohydrates. Divide a 9-inch plate into 3 sections, and consider your beverage the 4th section of your meal:  Food Group Examples of Foods/Beverages for This Section of your Meal   Section 1: Non-starchy vegetables  Fill ½ of your plate to include non-starchy vegetables Asparagus, broccoli, Fowler sprouts, cabbage, carrots, cauliflower, celery, cucumber, green beans, mushrooms, peppers, salad greens, tomatoes, or zucchini.   Section 2: Protein foods  Fill ¼ of your plate to include a lean protein Lean meat, poultry, fish, seafood, cheese, eggs, lean deli meat, tofu, beans, lentils, nuts or nut butters.   Section 3: Carbohydrate foods  Fill ¼ of your plate to include carbohydrate foods Whole grains, whole wheat bread, brown rice, whole grain pasta, polenta, corn tortillas, fruit, or starchy vegetables (potatoes, green peas, corn, beans, acorn squash, and butternut squash). One cup of milk also counts as a food that contains carbohydrate.    Section 4: Beverage  Choose water or a low-calorie drink for your beverage. Unsweetened tea, coffee, or flavored/sparkling water without added sugar.   Image reprinted with permission from The American Diabetes Association.  Copyright 2022 by the American Diabetes Association.  From Nutrition Care Manual

## 2025-06-09 NOTE — CARE PLAN
The patient's goals for the shift include  resting    The clinical goals for the shift include Patient will report managed pain this shift    Assumed care of patient at 1530. Patient does voice pain in right leg, Toradol administered x1 at 1938 and cold pack provided for further intervention. Ancef administered without s/s of adverse reactions this shift. VSS and patient remains afebrile. Dressing change completed to left great toe diabetic skin ulcer utilizing medihoney, guaze, and coban. BG at Dinner 121 and at . Patient resting in bed with call light within reach.

## 2025-06-09 NOTE — DISCHARGE INSTR - OTHER ORDERS
Thank you for choosing [facility] for your Health Care needs.  Also, thank you for allowing us to take you and your families preferences into account when determining your discharge plan.  Stay well!                                    Your Care Transitions Team Member:  Bre Robles Robin and Raven     For questions about your medications listed on your discharge instructions, please call the Nurses Station at [phone].

## 2025-06-09 NOTE — PROGRESS NOTES
06/09/25 1101   Discharge Planning   Living Arrangements Family members   Support Systems Family members;Parent;Children   Assistance Needed Patient states he is independent with ADL's and iADL's. He does drive and work, currently unemployed. Denies financial difficulties. Currently has no health insurance.   Type of Residence Private residence   Who is requesting discharge planning? Provider   Home or Post Acute Services None   Expected Discharge Disposition Home  (Hospital referral services made aware of no insurance. States his dad will  at discharge.)   Does the patient need discharge transport arranged? No  (Dad to )   Financial Resource Strain   How hard is it for you to pay for the very basics like food, housing, medical care, and heating? Not very   Housing Stability   In the last 12 months, was there a time when you were not able to pay the mortgage or rent on time? N   In the past 12 months, how many times have you moved where you were living? 0   At any time in the past 12 months, were you homeless or living in a shelter (including now)? N   Transportation Needs   In the past 12 months, has lack of transportation kept you from medical appointments or from getting medications? no   In the past 12 months, has lack of transportation kept you from meetings, work, or from getting things needed for daily living? No   Stroke Family Assessment   Stroke Family Assessment Needed No   Intensity of Service   Intensity of Service 0-30 min

## 2025-06-10 ENCOUNTER — APPOINTMENT (OUTPATIENT)
Dept: PRIMARY CARE | Facility: CLINIC | Age: 35
End: 2025-06-10

## 2025-06-10 ENCOUNTER — PATIENT OUTREACH (OUTPATIENT)
Dept: PRIMARY CARE | Facility: CLINIC | Age: 35
End: 2025-06-10
Payer: COMMERCIAL

## 2025-06-10 DIAGNOSIS — E78.2 MIXED HYPERLIPIDEMIA: ICD-10-CM

## 2025-06-10 DIAGNOSIS — E11.65 UNCONTROLLED TYPE 2 DIABETES MELLITUS WITH HYPERGLYCEMIA: Primary | ICD-10-CM

## 2025-06-10 NOTE — PROGRESS NOTES
Discharge Facility: [facility]  Discharge Diagnosis: Cellulitis right lower extremity   Admission Date: 6/6/2025  Discharge Date: 6/9/2025    PCP Appointment Date: Appointment with Cindi Vergara MD (06/12/2025)   Specialist Appointment Date: No appts scheduled  Hospital Encounter and Summary Linked: Yes  ED to Hosp-Admission (Discharged) with Deni Hernandez MD; Jimy Richard MD (06/06/2025)     See discharge assessment below for further details:    Wrap Up  Wrap Up Additional Comments: 35yoM with PMHx of DM and HLD presented to the ED with right leg swelling and redness. Patient denied any injury, but does have an open wound on his right great toe. Patient was being seen in the Wound Clinic, but missed his 5/6/2025 appt and did not reschedule. ID consulted. Patient treated with IV abx and discharged to home self care with Rx for cephalexin. At time of outrach, the patient stated he is feeling improved. He can now bear weight on his leg and ambulate. Patient taking cephalexin as directed. No follow up appts scheduled for open wound on right great toe. As of note, last HbA1c 3/17/2025 was 11.3. Lab not rechechecked during hospital course. (6/10/2025  1:53 PM)    Medications  Medications reviewed with patient/caregiver?: Yes (Patient) (6/10/2025  1:53 PM)  Is the patient having any side effects they believe may be caused by any medication additions or changes?: No (6/10/2025  1:53 PM)  Does the patient have all medications ordered at discharge?: Yes (6/10/2025  1:53 PM)  Care Management Interventions: No intervention needed (6/10/2025  1:53 PM)  Prescription Comments: NEW: cephalexin (6/10/2025  1:53 PM)  Is the patient taking all medications as directed (includes completed medication regime)?: Yes (6/10/2025  1:53 PM)  Care Management Interventions: Provided patient education (6/10/2025  1:53 PM)  Medication Comments: Verbalized understanding of medication changes. (6/10/2025  1:53  PM)    Appointments  Does the patient have a primary care provider?: Yes (6/10/2025  1:53 PM)  Care Management Interventions: Verified appointment date/time/provider (6/10/2025  1:53 PM)  Has the patient kept scheduled appointments due by today?: Not applicable (6/10/2025  1:53 PM)  Care Management Interventions: Advised to schedule with specialist (Podiatry) (6/10/2025  1:53 PM)    Self Management  What is the home health agency?: N/A (6/10/2025  1:53 PM)  What Durable Medical Equipment (DME) was ordered?: N/A (6/10/2025  1:53 PM)    Patient Teaching  Does the patient have access to their discharge instructions?: Yes (6/10/2025  1:53 PM)  Care Management Interventions: Reviewed instructions with patient (6/10/2025  1:53 PM)  What is the patient's perception of their health status since discharge?: Improving (6/10/2025  1:53 PM)  Is the patient/caregiver able to teach back the hierarchy of who to call/visit for symptoms/problems? PCP, Specialist, Home Health nurse, Urgent Care, ED, 911: Yes (6/10/2025  1:53 PM)  Patient/Caregiver Education Comments: Patient verbalized understanding of discharge instructions. Provided contact information for nonurgent questions or concerns. (6/10/2025  1:53 PM)

## 2025-06-10 NOTE — DISCHARGE SUMMARY
"Discharge Diagnosis  Cellulitis       Discharge Meds     Medication List      START taking these medications     cephalexin 500 mg capsule; Commonly known as: Keflex; Take 1 capsule   (500 mg) by mouth 2 times a day for 10 days.     CONTINUE taking these medications     empagliflozin 25 mg tablet; Commonly known as: Jardiance; Take 1 tablet   (25 mg) by mouth once daily.   honey gel topical gel; Commonly known as: Medihoney   metFORMIN  mg 24 hr tablet; Commonly known as: Glucophage-XR; Take   2 tablets (1,000 mg) by mouth 2 times daily (morning and late afternoon).   Do not crush, chew, or split.   pravastatin 80 mg tablet; Commonly known as: Pravachol; Take 1 tablet   (80 mg) by mouth once daily.       Test Results Pending At Discharge  Pending Labs       Order Current Status    Blood Culture Preliminary result    Blood Culture Preliminary result            Hospital Course   HPI: \"Oneil Montoya is a very pleasant 35 year old gentleman with a history of DM and HLD, presented to the ER with right leg swelling and redness. Area is tender to the touch. States he noticed it yesterday. Denies any trauma to the area. He has an ulcer on his big toe on his left foot that is currently being followed. He denies chest pain, shortness of breath or heart palpitations.\"    Oneil was admitted to Medicine and treated for cellulitis of his right lower extremity.  He was treated with IV cefazolin 3 times a day and transition to oral Keflex upon discharge.   Chronic medical conditions were also addressed and monitored.  Infectious disease consulted during their inpatient stay. Labs were closely monitored.  He was discharged in stable condition with the above medication changes and/or additions. Recommendations were made to follow up with pt's PCP in 1-2 weeks.   See full inpatient plan below.     Problem list:  Cellulitis   -continue Zosyn and Vancomycin transition to Cefazolin one gram three times a day   -negative for a DVT "   -ID consult appreciate rec  --- Discharge on Keflex to complete course  --- Follow-up with wound clinic and PCP     RJ  DM  --- Continue chronic medical therapies, follow-up as necessary    Pertinent Physical Exam At Time of Discharge  Physical Exam  Constitutional:       General: He is not in acute distress.     Appearance: Normal appearance. He is not toxic-appearing.   HENT:      Head: Normocephalic and atraumatic.      Mouth/Throat:      Mouth: Mucous membranes are moist.   Eyes:      Extraocular Movements: Extraocular movements intact.      Pupils: Pupils are equal, round, and reactive to light.   Cardiovascular:      Rate and Rhythm: Normal rate and regular rhythm.      Pulses: Normal pulses.      Heart sounds: Normal heart sounds. No murmur heard.     No gallop.   Pulmonary:      Effort: Pulmonary effort is normal. No respiratory distress.      Breath sounds: Normal breath sounds. No wheezing, rhonchi or rales.   Abdominal:      General: Bowel sounds are normal. There is no distension.      Palpations: Abdomen is soft.      Tenderness: There is no abdominal tenderness. There is no guarding or rebound.   Musculoskeletal:         General: No swelling, tenderness, deformity or signs of injury. Normal range of motion.      Cervical back: Normal range of motion and neck supple.      Right lower leg: Edema present.   Skin:     General: Skin is warm and dry.      Capillary Refill: Capillary refill takes less than 2 seconds.      Coloration: Skin is not jaundiced.      Findings: Erythema and rash present. No bruising.   Neurological:      General: No focal deficit present.      Mental Status: He is alert and oriented to person, place, and time. Mental status is at baseline.      Cranial Nerves: No cranial nerve deficit.      Sensory: No sensory deficit.      Motor: No weakness.      Gait: Gait normal.   Psychiatric:         Mood and Affect: Mood normal.         Behavior: Behavior normal.         Thought Content:  Thought content normal.         Judgment: Judgment normal.       Stable for discharge to home.  Total cumulative time spent in preparation of this discharge including documentation review, coordination of care with the medical team including PT/SW/care coordinators and treating consultants, discussion with patient and pertinent family members and finalization of prescriptions, follow-up appointments, and this discharge summary was approximately 45 minutes.    Outpatient Follow-Up  Future Appointments   Date Time Provider Department Center   6/10/2025  4:45 PM Cindi Vergara MD [id number]-C[facility]

## 2025-06-10 NOTE — PROGRESS NOTES
HPI:    Here for 3 mos aj on [facility] dm:  on metformin er 1000 mg bid and jardiance 25 mg   Diabetes Mellitus:          The patient is complaining of nothing except was recently admitted to [facility] 6/6 to 6/9 for cellulitis of his right lower leg.  He is still dealing with his left big toe ulcer with podiatry.  Cellulitis treated with iv cefazolin and then transitioned to keflex.          Blood sugar monitoring not done by pt          Hypoglycemic episodes are none         The results of the last HbgA1c were abnormal - at 8.6 today it is:          The microalbumin due         The feet/last exam done 3/17/25 with ulcer noted on left great toe that prompted referral to podiatry; pt with diminished sensation ; pulses palpable         Last eye exam due.  He will go see DR Toure         Side effects of the medications include none         Compliance with the medical regimen has been Good    Hyperlipidemia:   The patient does not use medications that may worsen dyslipidemias (corticosteroids, progestins, anabolic steroids, diuretics, beta-blockers, amiodarone, cyclosporine, olanzapine). The patient exercises infrequently.  The patient is not known to have coexisting coronary artery disease.      MEDICAL HISTORY:   Past Medical History:   Diagnosis Date    Mixed hyperlipidemia 03/20/2025    Skin ulcer of left great toe (Multi) 03/20/2025    Uncontrolled type 2 diabetes mellitus with hyperglycemia, without long-term current use of insulin 04/15/2025     MEDICATIONS:   Current Outpatient Medications   Medication Sig Dispense Refill    cephalexin (Keflex) 500 mg capsule Take 1 capsule (500 mg) by mouth 2 times a day for 10 days. 20 capsule 0    empagliflozin (Jardiance) 25 mg tablet Take 1 tablet (25 mg) by mouth once daily. 90 tablet 0    honey (Medihoney) gel topical gel Apply topically once daily.      metFORMIN XR (Glucophage-XR) 500 mg 24 hr tablet Take 2 tablets (1,000 mg) by mouth 2 times daily (morning and late  afternoon). Do not crush, chew, or split. 360 tablet 0    pravastatin (Pravachol) 80 mg tablet Take 1 tablet (80 mg) by mouth once daily. 90 tablet 0     No current facility-administered medications for this visit.     ALLERGIES: No Known Allergies  FAMILY HISTORY:   Family History   Problem Relation Name Age of Onset    No Known Problems Mother      Diabetes Father      Heart disease Father       SOCIAL HISTORY:   Social History     Tobacco Use    Smoking status: Never    Smokeless tobacco: Never   Vaping Use    Vaping status: Never Used   Substance Use Topics    Alcohol use: Never    Drug use: Never         ROS:      CONSTITUTION:  alert and oriented     OPHTHALMOLOGY:          no Change in vision.         CARDIOLOGY:          no Chest pain.  no Dyspnea on exertion.  no Shortness of breath.         ENDOCRINOLOGY:          no Excessive thirst.  no Excessive urination.  no Fatigue.  no Skin changes.  no Weight gain.  no Weight loss.         SKIN:          no Healing problems.         NEUROLOGY:          no Loss of sensation in specific body area.  no Burning pain in feet.  no Peripheral neuropathy.  no Tingling/numbness.    LABS: did not get urine tests done  Lab Results   Component Value Date    GLUCOSE 122 (H) 06/09/2025    CALCIUM 8.7 06/09/2025     06/09/2025    K 3.5 06/09/2025    CO2 23 06/09/2025     06/09/2025    BUN 20 06/09/2025    CREATININE 0.70 06/09/2025     Lab Results   Component Value Date    CHOL 230 (H) 03/17/2025     Lab Results   Component Value Date    HDL 37 (L) 03/17/2025     Lab Results   Component Value Date    LDLCALC  03/17/2025      Comment:         LDL cholesterol not calculated. Triglyceride levels  greater than 400 mg/dL invalidate calculated LDL results.           Reference range: <100     Desirable range <100 mg/dL for primary prevention;    <70 mg/dL for patients with CHD or diabetic patients   with > or = 2 CHD risk factors.     LDL-C is now calculated using the  "Dougie   calculation, which is a validated novel method providing   better accuracy than the Friedewald equation in the   estimation of LDL-C.   Johnny HUGGINS et al. JUAN. 2013;310(19): 7683-8113   (http://[employer]/faq/WJV231)       Lab Results   Component Value Date    TRIG 697 (H) 03/17/2025     No components found for: \"CHOLHDL\"  Lab Results   Component Value Date    HGBA1C 8.5 (A) 04/15/2025          VITAL SIGNS:  There were no vitals taken for this visit.    General Appearance: awake, alert, oriented, in no acute distress  Lungs: Normal expansion.  Clear to auscultation.  No rales, rhonchi, or wheezing.  Heart: Heart sounds are normal.  Regular rate and rhythm without murmur, gallop or rub.  Extremities: Extremities warm to touch, pink, with no edema.  Neurologic: Alert and oriented x 3, gait normal., reflexes normal and symmetric, strength and  sensation grossly normal          Diagnoses and all orders for this visit:  Uncontrolled type 2 diabetes mellitus with hyperglycemia (Primary)  Mixed hyperlipidemia          "

## 2025-06-11 LAB
BACTERIA BLD CULT: NORMAL
BACTERIA BLD CULT: NORMAL

## 2025-06-12 ENCOUNTER — APPOINTMENT (OUTPATIENT)
Dept: PRIMARY CARE | Facility: CLINIC | Age: 35
End: 2025-06-12
Payer: COMMERCIAL

## 2025-06-12 ENCOUNTER — OFFICE VISIT (OUTPATIENT)
Dept: PRIMARY CARE | Facility: CLINIC | Age: 35
End: 2025-06-12
Payer: COMMERCIAL

## 2025-06-12 VITALS
BODY MASS INDEX: 36.13 KG/M2 | HEART RATE: 102 BPM | SYSTOLIC BLOOD PRESSURE: 138 MMHG | OXYGEN SATURATION: 99 % | WEIGHT: 296.8 LBS | DIASTOLIC BLOOD PRESSURE: 78 MMHG

## 2025-06-12 DIAGNOSIS — E11.65 UNCONTROLLED TYPE 2 DIABETES MELLITUS WITH HYPERGLYCEMIA: Primary | ICD-10-CM

## 2025-06-12 DIAGNOSIS — Z09 HOSPITAL DISCHARGE FOLLOW-UP: ICD-10-CM

## 2025-06-12 DIAGNOSIS — L03.115 CELLULITIS OF RIGHT LOWER EXTREMITY: ICD-10-CM

## 2025-06-12 DIAGNOSIS — E78.2 MIXED HYPERLIPIDEMIA: ICD-10-CM

## 2025-06-12 LAB — POC HEMOGLOBIN A1C: 7 % (ref 4.2–6.5)

## 2025-06-12 PROCEDURE — 3075F SYST BP GE 130 - 139MM HG: CPT | Performed by: FAMILY MEDICINE

## 2025-06-12 PROCEDURE — 99496 TRANSJ CARE MGMT HIGH F2F 7D: CPT | Performed by: FAMILY MEDICINE

## 2025-06-12 PROCEDURE — 3078F DIAST BP <80 MM HG: CPT | Performed by: FAMILY MEDICINE

## 2025-06-12 PROCEDURE — 83036 HEMOGLOBIN GLYCOSYLATED A1C: CPT | Performed by: FAMILY MEDICINE

## 2025-06-12 PROCEDURE — 1036F TOBACCO NON-USER: CPT | Performed by: FAMILY MEDICINE

## 2025-06-12 PROCEDURE — 3051F HG A1C>EQUAL 7.0%<8.0%: CPT | Performed by: FAMILY MEDICINE

## 2025-06-12 ASSESSMENT — PAIN SCALES - GENERAL: PAINLEVEL_OUTOF10: 6

## 2025-06-12 ASSESSMENT — PATIENT HEALTH QUESTIONNAIRE - PHQ9
1. LITTLE INTEREST OR PLEASURE IN DOING THINGS: NOT AT ALL
SUM OF ALL RESPONSES TO PHQ9 QUESTIONS 1 AND 2: 0
2. FEELING DOWN, DEPRESSED OR HOPELESS: NOT AT ALL

## 2025-06-12 NOTE — PROGRESS NOTES
HPI:    Here for 3 mos aj on [facility] dm:  on metformin er 1000 mg bid and jardiance 25 mg which was started 2 mos ago but hasn't been on it for all this time except started it last week due to stomach cramps and didn't know which med was to blame but no issues since restarting.   Diabetes Mellitus:          The patient is complaining of nothing except was recently admitted to [facility] 6/6 to 6/9 for cellulitis of his right lower leg.  He is still dealing with his left big toe ulcer with podiatry and will be going to wound center at [facility].  .  Cellulitis treated with iv cefazolin and then transitioned to keflex and he still has 7 days left of abx.           Blood sugar monitoring not done by pt          Hypoglycemic episodes are none         The results of the last HbgA1c were abnormal - at 8.6 today it is: 7.0         The microalbumin due         The feet/last exam done 3/17/25 with ulcer noted on left great toe that prompted referral to podiatry; pt with diminished sensation ; pulses palpable         Last eye exam due.  He will go see DR Toure; pt reminded he is due         Side effects of the medications include none         Compliance with the medical regimen has been Good    Hyperlipidemia:   The patient does not use medications that may worsen dyslipidemias (corticosteroids, progestins, anabolic steroids, diuretics, beta-blockers, amiodarone, cyclosporine, olanzapine). The patient exercises infrequently.  The patient is not known to have coexisting coronary artery disease.      MEDICAL HISTORY:   Past Medical History:   Diagnosis Date    Mixed hyperlipidemia 03/20/2025    Skin ulcer of left great toe (Multi) 03/20/2025    Uncontrolled type 2 diabetes mellitus with hyperglycemia, without long-term current use of insulin 04/15/2025     MEDICATIONS:   Current Outpatient Medications   Medication Sig Dispense Refill    cephalexin (Keflex) 500 mg capsule Take 1 capsule (500 mg) by mouth 2 times a day for 10 days. 20  capsule 0    empagliflozin (Jardiance) 25 mg tablet Take 1 tablet (25 mg) by mouth once daily. 90 tablet 0    honey (Medihoney) gel topical gel Apply topically once daily.      metFORMIN XR (Glucophage-XR) 500 mg 24 hr tablet Take 2 tablets (1,000 mg) by mouth 2 times daily (morning and late afternoon). Do not crush, chew, or split. 360 tablet 0    pravastatin (Pravachol) 80 mg tablet Take 1 tablet (80 mg) by mouth once daily. 90 tablet 0     No current facility-administered medications for this visit.     ALLERGIES: No Known Allergies  FAMILY HISTORY:   Family History   Problem Relation Name Age of Onset    No Known Problems Mother      Diabetes Father      Heart disease Father       SOCIAL HISTORY:   Social History     Tobacco Use    Smoking status: Never    Smokeless tobacco: Never   Vaping Use    Vaping status: Never Used   Substance Use Topics    Alcohol use: Never    Drug use: Never         ROS:      CONSTITUTION:  alert and oriented     OPHTHALMOLOGY:          no Change in vision.         CARDIOLOGY:          no Chest pain.  no Dyspnea on exertion.  no Shortness of breath.         ENDOCRINOLOGY:          no Excessive thirst.  no Excessive urination.  no Fatigue.  no Skin changes.  no Weight gain.  no Weight loss.         SKIN:          no Healing problems.         NEUROLOGY:          no Loss of sensation in specific body area.  no Burning pain in feet.  no Peripheral neuropathy.  no Tingling/numbness.        LABS: did not get urine a/c ratio tests done in April  Lab Results   Component Value Date    GLUCOSE 122 (H) 06/09/2025    CALCIUM 8.7 06/09/2025     06/09/2025    K 3.5 06/09/2025    CO2 23 06/09/2025     06/09/2025    BUN 20 06/09/2025    CREATININE 0.70 06/09/2025     Lab Results   Component Value Date    CHOL 230 (H) 03/17/2025     Lab Results   Component Value Date    HDL 37 (L) 03/17/2025     Lab Results   Component Value Date    LDLCALC  03/17/2025      Comment:         LDL cholesterol  "not calculated. Triglyceride levels  greater than 400 mg/dL invalidate calculated LDL results.           Reference range: <100     Desirable range <100 mg/dL for primary prevention;    <70 mg/dL for patients with CHD or diabetic patients   with > or = 2 CHD risk factors.     LDL-C is now calculated using the Dougie   calculation, which is a validated novel method providing   better accuracy than the Friedewald equation in the   estimation of LDL-C.   Johnny SS et al. JUAN. 2013;310(19): 2652-2926   (http://[employer]/faq/SXY947)       Lab Results   Component Value Date    TRIG 697 (H) 03/17/2025     No components found for: \"CHOLHDL\"  Lab Results   Component Value Date    HGBA1C 7.0 (A) 06/12/2025          VITAL SIGNS:  /78   Pulse 102   Wt 135 kg (296 lb 12.8 oz)   SpO2 99%   BMI 36.13 kg/m²     General Appearance: awake, alert, oriented, in no acute distress  Lungs: Normal expansion.  Clear to auscultation.  No rales, rhonchi, or wheezing.  Heart: Heart sounds are normal.  Regular rate and rhythm without murmur, gallop or rub.  Extremities: Extremities warm to touch, pink, with no edema.  Neurologic: Alert and oriented x 3, gait normal., reflexes normal and symmetric, strength and  sensation grossly normal  Right leg: dusky purple/red discoloration of lower right leg with bandage wrapped around it.  + swelling and increased warmth but pt says it is much better than before hospitalization.  Looks better in AM after having leg elevated at night.        Oneil was seen today for diabetes.  Diagnoses and all orders for this visit:  Uncontrolled type 2 diabetes mellitus with hyperglycemia (Primary)  Comments:  cont metformin and jardiance  Orders:  -     POCT glycosylated hemoglobin (Hb A1C) manually resulted  -     Follow Up In Primary Care - Established  -     Albumin-Creatinine Ratio, Urine Random; Future  -     Albumin-Creatinine Ratio, Urine Random  -     Follow Up In Primary " Care - Established; Future  Mixed hyperlipidemia  -     Lipid Panel; Future  -     Lipid Panel  Cellulitis of right lower extremity  Comments:  cont keflex  Hospital discharge follow-up

## 2025-06-14 LAB
ATRIAL RATE: 127 BPM
P AXIS: 39 DEGREES
P OFFSET: 201 MS
P ONSET: 149 MS
PR INTERVAL: 156 MS
Q ONSET: 227 MS
QRS COUNT: 21 BEATS
QRS DURATION: 86 MS
QT INTERVAL: 290 MS
QTC CALCULATION(BAZETT): 421 MS
QTC FREDERICIA: 372 MS
R AXIS: 76 DEGREES
T AXIS: 25 DEGREES
T OFFSET: 372 MS
VENTRICULAR RATE: 127 BPM

## 2025-06-17 DIAGNOSIS — E11.65 UNCONTROLLED TYPE 2 DIABETES MELLITUS WITH HYPERGLYCEMIA, WITHOUT LONG-TERM CURRENT USE OF INSULIN: Primary | ICD-10-CM

## 2025-06-17 NOTE — PROGRESS NOTES
I reviewed the progress note and agree with the resident’s findings and plans as written. Case discussed with resident.    Vianca Lewis, PharmD

## 2025-06-25 ENCOUNTER — PATIENT OUTREACH (OUTPATIENT)
Dept: PRIMARY CARE | Facility: CLINIC | Age: 35
End: 2025-06-25
Payer: COMMERCIAL

## 2025-06-25 NOTE — PROGRESS NOTES
Unable to reach patient for follow up call after recent hospitalization. Left voicemail with call back number for patient to call if needed.

## 2025-07-02 ENCOUNTER — APPOINTMENT (OUTPATIENT)
Dept: PHARMACY | Facility: HOSPITAL | Age: 35
End: 2025-07-02
Payer: COMMERCIAL

## 2025-09-04 ENCOUNTER — PATIENT OUTREACH (OUTPATIENT)
Dept: PRIMARY CARE | Facility: CLINIC | Age: 35
End: 2025-09-04
Payer: COMMERCIAL